# Patient Record
Sex: FEMALE | Race: WHITE | NOT HISPANIC OR LATINO | Employment: OTHER | URBAN - METROPOLITAN AREA
[De-identification: names, ages, dates, MRNs, and addresses within clinical notes are randomized per-mention and may not be internally consistent; named-entity substitution may affect disease eponyms.]

---

## 2018-11-20 ENCOUNTER — HOSPITAL ENCOUNTER (OUTPATIENT)
Dept: URGENT CARE | Facility: CLINIC | Age: 73
Discharge: HOME | End: 2018-11-20
Attending: FAMILY MEDICINE | Admitting: FAMILY MEDICINE

## 2018-12-04 ENCOUNTER — HOSPITAL ENCOUNTER (OUTPATIENT)
Dept: MAMMOGRAPHY | Facility: HOSPITAL | Age: 73
Discharge: HOME | End: 2018-12-04
Attending: NURSE PRACTITIONER | Admitting: NURSE PRACTITIONER

## 2019-06-14 ENCOUNTER — TELEPHONE (OUTPATIENT)
Dept: CARDIAC SURGERY | Facility: CLINIC | Age: 74
End: 2019-06-14

## 2019-06-14 NOTE — TELEPHONE ENCOUNTER
LVM FOR PT TO CALL BACK AND SCHEDULE NEW PT APPT WITH DR ABHI JUAREZ IN [place] OFFICE PER REFERRAL FROM DR QUINTANILLA

## 2019-06-18 ENCOUNTER — TELEPHONE (OUTPATIENT)
Dept: CARDIOLOGY | Facility: CLINIC | Age: 74
End: 2019-06-18

## 2019-06-18 NOTE — TELEPHONE ENCOUNTER
Returned patient's call in regards to whether or not she is supposed to be taking Cartia  mg.  Patient made aware that yes she is to continue taking Cartia.  Patient made aware that Dr. Wild stopped her Tikosyn.  Patient verbalized understanding.

## 2019-07-09 RX ORDER — DILTIAZEM HYDROCHLORIDE 180 MG/1
CAPSULE, EXTENDED RELEASE ORAL
Qty: 30 CAPSULE | Refills: 0 | Status: SHIPPED | OUTPATIENT
Start: 2019-07-09 | End: 2019-08-11 | Stop reason: SDUPTHER

## 2019-07-16 ENCOUNTER — ANTICOAGULATION VISIT (OUTPATIENT)
Dept: CARDIOLOGY | Facility: CLINIC | Age: 74
End: 2019-07-16

## 2019-07-16 VITALS
HEART RATE: 83 BPM | WEIGHT: 234 LBS | SYSTOLIC BLOOD PRESSURE: 131 MMHG | BODY MASS INDEX: 36.65 KG/M2 | DIASTOLIC BLOOD PRESSURE: 87 MMHG

## 2019-07-16 DIAGNOSIS — I48.20 CHRONIC ATRIAL FIBRILLATION (HCC): ICD-10-CM

## 2019-07-16 DIAGNOSIS — Z79.01 LONG TERM (CURRENT) USE OF ANTICOAGULANTS: ICD-10-CM

## 2019-07-16 PROBLEM — I48.91 ATRIAL FIBRILLATION (HCC): Status: ACTIVE | Noted: 2019-07-16

## 2019-07-16 LAB — INR PPP: 2.1 (ref 2–3)

## 2019-07-16 PROCEDURE — 85610 PROTHROMBIN TIME: CPT | Performed by: INTERNAL MEDICINE

## 2019-07-16 PROCEDURE — 36416 COLLJ CAPILLARY BLOOD SPEC: CPT | Performed by: INTERNAL MEDICINE

## 2019-07-19 ENCOUNTER — TELEPHONE (OUTPATIENT)
Dept: CARDIOLOGY | Facility: CLINIC | Age: 74
End: 2019-07-19

## 2019-07-19 NOTE — TELEPHONE ENCOUNTER
Return patient's call.  Pt states she cancelled her appointment with Dr. Wild for today because she has not seen surgeon yet.  Pt states she will call to reschedule her appointment with Dr. Wild.

## 2019-08-05 ENCOUNTER — OFFICE VISIT (OUTPATIENT)
Dept: CARDIAC SURGERY | Facility: CLINIC | Age: 74
End: 2019-08-05

## 2019-08-05 VITALS
TEMPERATURE: 98.3 F | HEART RATE: 93 BPM | DIASTOLIC BLOOD PRESSURE: 62 MMHG | HEIGHT: 66 IN | BODY MASS INDEX: 37.48 KG/M2 | OXYGEN SATURATION: 91 % | WEIGHT: 233.2 LBS | SYSTOLIC BLOOD PRESSURE: 114 MMHG

## 2019-08-05 DIAGNOSIS — I48.0 PAROXYSMAL ATRIAL FIBRILLATION (HCC): Primary | ICD-10-CM

## 2019-08-05 DIAGNOSIS — I10 ESSENTIAL HYPERTENSION: ICD-10-CM

## 2019-08-05 DIAGNOSIS — E66.9 OBESITY WITH BODY MASS INDEX OF 30.0-39.9: ICD-10-CM

## 2019-08-05 PROCEDURE — 99205 OFFICE O/P NEW HI 60 MIN: CPT | Performed by: THORACIC SURGERY (CARDIOTHORACIC VASCULAR SURGERY)

## 2019-08-05 RX ORDER — WARFARIN SODIUM 4 MG/1
4 TABLET ORAL
COMMUNITY
End: 2019-10-03 | Stop reason: SDUPTHER

## 2019-08-05 RX ORDER — LYSINE 500 MG
TABLET ORAL DAILY
COMMUNITY

## 2019-08-05 RX ORDER — ESOMEPRAZOLE MAGNESIUM 40 MG/1
40 CAPSULE, DELAYED RELEASE ORAL
COMMUNITY

## 2019-08-05 RX ORDER — CHLORAL HYDRATE 500 MG
CAPSULE ORAL
COMMUNITY

## 2019-08-05 RX ORDER — POTASSIUM CHLORIDE 750 MG/1
10 TABLET, FILM COATED, EXTENDED RELEASE ORAL 2 TIMES DAILY
COMMUNITY
End: 2020-06-22 | Stop reason: DRUGHIGH

## 2019-08-12 RX ORDER — FUROSEMIDE 20 MG/1
TABLET ORAL
Qty: 60 TABLET | Refills: 0 | Status: SHIPPED | OUTPATIENT
Start: 2019-08-12 | End: 2019-09-08 | Stop reason: SDUPTHER

## 2019-08-12 RX ORDER — DILTIAZEM HYDROCHLORIDE 180 MG/1
CAPSULE, EXTENDED RELEASE ORAL
Qty: 30 CAPSULE | Refills: 0 | Status: SHIPPED | OUTPATIENT
Start: 2019-08-12 | End: 2019-09-08 | Stop reason: SDUPTHER

## 2019-08-17 NOTE — PROGRESS NOTES
BCS CONSULT    Reason for Consultation: PAF, evaluation for possible Convergent Procedure.    History of Present Illness:     This is a pleasant 73 year old woman with PAF and sick sinus syndrome. She received a PPM in 2012. She has been electrocardioverted several times. She has also suffered a DVT and has an IVC filter in place; this IVC filter prevented her from previously receiving an ablation (access could not be acquired across the IVC filter).     She has been hospitalized in the past, according to her chart, for CHF exacerbation; the patient herself, interestingly, disputes this. She does not feel she has symptoms when she has her bouts of atrial fibrillation. She is currently on Tikosyn for suppression therapy. She is on warfarin.  She has been referred for a possible Convergent procedure.    Review of Systems   Constitutional: Negative for activity change, appetite change, chills, diaphoresis, fatigue, fever and unexpected weight change.   HENT: Negative for congestion, hearing loss, nosebleeds, postnasal drip, rhinorrhea, sinus pain, sneezing, tinnitus and voice change.    Eyes: Negative for photophobia, pain, discharge, redness, itching and visual disturbance.   Respiratory: Positive for shortness of breath. Negative for apnea, cough, choking, chest tightness, wheezing and stridor.    Cardiovascular: Positive for leg swelling. Negative for chest pain and palpitations.   Gastrointestinal: Negative for abdominal distention, abdominal pain, blood in stool, constipation, diarrhea, nausea, rectal pain and vomiting.   Endocrine: Negative for cold intolerance, heat intolerance, polydipsia, polyphagia and polyuria.   Genitourinary: Negative for dysuria, flank pain and hematuria.   Musculoskeletal: Positive for arthralgias, back pain and gait problem. Negative for joint swelling, myalgias, neck pain and neck stiffness.   Skin: Negative for color change, pallor, rash and wound.   Allergic/Immunologic: Negative for  environmental allergies, food allergies and immunocompromised state.   Neurological: Negative for dizziness, tremors, seizures, syncope, facial asymmetry, speech difficulty, weakness, light-headedness, numbness and headaches.   Hematological: Negative for adenopathy. Does not bruise/bleed easily.   Psychiatric/Behavioral: Negative for agitation, behavioral problems, confusion, decreased concentration, dysphoric mood, hallucinations, self-injury, sleep disturbance and suicidal ideas. The patient is not nervous/anxious and is not hyperactive.         Past Medical History:   Diagnosis Date   • Atrial fibrillation (CMS/HCC)    • CHF (congestive heart failure) (CMS/HCC)    • DVT (deep venous thrombosis) (CMS/HCC)     and venous filter   • Hypertension    • Stroke (CMS/HCC)      Past Surgical History:   Procedure Laterality Date   • BARIATRIC SURGERY  2005   • CARDIAC CATHETERIZATION  02/01/2012   • CARDIOVERSION      CARDIOVERSION X 3   • CHOLECYSTECTOMY     • GASTRIC BYPASS  2005   • HERNIA REPAIR     • OTHER SURGICAL HISTORY      RF ablation failure because of lack of RA access   • PACEMAKER IMPLANTATION  08/15/2012    permanent dual chamber - medtronic MRI compatible   • TONSILLECTOMY       Family History   Problem Relation Age of Onset   • Stroke Father    • Stroke Other    • Heart disease Other         FH - MI     Social History     Tobacco Use   • Smoking status: Never Smoker   • Smokeless tobacco: Never Used   Substance Use Topics   • Alcohol use: No     Frequency: Never   • Drug use: No       (Not in a hospital admission)    Current Outpatient Medications:   •  esomeprazole (nexIUM) 40 MG capsule, Take 40 mg by mouth Every Morning Before Breakfast., Disp: , Rfl:   •  L-Lysine 500 MG tablet tablet, Take  by mouth Daily., Disp: , Rfl:   •  metoprolol tartrate (LOPRESSOR) 25 MG tablet, Take 25 mg by mouth 2 (Two) Times a Day., Disp: , Rfl:   •  Multiple Vitamin (MULTI-VITAMIN DAILY PO), Take  by mouth., Disp: , Rfl:  "  •  Omega-3 1000 MG capsule, Take  by mouth., Disp: , Rfl:   •  potassium chloride (K-DUR) 10 MEQ CR tablet, Take 10 mEq by mouth 2 (Two) Times a Day., Disp: , Rfl:   •  warfarin (COUMADIN) 4 MG tablet, Take 4 mg by mouth Daily., Disp: , Rfl:   •  CARTIA  MG 24 hr capsule, TAKE ONE CAPSULE BY MOUTH DAILY, Disp: 30 capsule, Rfl: 0  •  furosemide (LASIX) 20 MG tablet, TAKE ONE TABLET BY MOUTH TWICE A DAY, Disp: 60 tablet, Rfl: 0    Allergies:  Lactulose    Objective      Vital Signs       Flowsheet Rows      First Filed Value   Admission Height  167.6 cm (66\") Documented at 08/05/2019 1219   Admission Weight  106 kg (233 lb 3.2 oz) Documented at 08/05/2019 1219        167.6 cm (66\")    Physical Exam   Constitutional: She is oriented to person, place, and time. She appears well-developed and well-nourished. No distress.   HENT:   Head: Normocephalic and atraumatic.   Mouth/Throat: No oropharyngeal exudate.   Eyes: EOM are normal. Pupils are equal, round, and reactive to light. No scleral icterus.   Neck: Normal range of motion. Neck supple. No JVD present. No tracheal deviation present. No thyromegaly present.   Cardiovascular: Normal rate. An irregular rhythm present. PMI is not displaced. Exam reveals no gallop and no friction rub.   No murmur heard.  Pulses:       Radial pulses are 2+ on the right side, and 2+ on the left side.        Femoral pulses are 2+ on the right side, and 2+ on the left side.       Dorsalis pedis pulses are 1+ on the right side, and 1+ on the left side.   Pulmonary/Chest: Effort normal. No stridor. No respiratory distress. She has no wheezes. She has rales. She exhibits no tenderness.   Abdominal: Soft. Bowel sounds are normal. She exhibits no distension. There is no tenderness.   Obese.   Musculoskeletal: Normal range of motion. She exhibits edema. She exhibits no tenderness or deformity.   Lymphadenopathy:     She has no cervical adenopathy.   Neurological: She is alert and oriented " to person, place, and time. She has normal strength. She displays normal reflexes. No cranial nerve deficit or sensory deficit. She exhibits normal muscle tone. She displays a negative Romberg sign. Coordination normal. GCS eye subscore is 4. GCS verbal subscore is 5. GCS motor subscore is 6.   Skin: Skin is warm and dry. Capillary refill takes 2 to 3 seconds. No rash noted. She is not diaphoretic. No erythema. No pallor.   Psychiatric: She has a normal mood and affect. Her speech is normal and behavior is normal. Judgment and thought content normal. Cognition and memory are normal.   Vitals reviewed.      Results Review:       Assessment/Plan:     This is a pleasant 73 year old woman with PAF. The patient insists she is asymptomatic, even when in afib. She is also exceedingly wary of undergoing any surgical procedure, and would like to avoid any invasive intervention if possible. I am also sceptical that a stand alone partial left atrial epicardial ablation will hold her atrial fibrillation, given its chronicity. The patient would like to continue her medical treatment only, for now. She is agreeable to a return visit in 6 months to gauge her progress.     Thank you for this kind consultation.      Lara Pérez MD  08/17/19  4:11 PM

## 2019-08-20 ENCOUNTER — ANTICOAGULATION VISIT (OUTPATIENT)
Dept: CARDIOLOGY | Facility: CLINIC | Age: 74
End: 2019-08-20

## 2019-08-20 VITALS
WEIGHT: 225.5 LBS | DIASTOLIC BLOOD PRESSURE: 65 MMHG | BODY MASS INDEX: 36.4 KG/M2 | HEART RATE: 71 BPM | SYSTOLIC BLOOD PRESSURE: 132 MMHG

## 2019-08-20 DIAGNOSIS — I48.0 PAROXYSMAL ATRIAL FIBRILLATION (HCC): ICD-10-CM

## 2019-08-20 DIAGNOSIS — Z79.01 LONG TERM (CURRENT) USE OF ANTICOAGULANTS: ICD-10-CM

## 2019-08-20 LAB — INR PPP: 2.9 (ref 2–3)

## 2019-08-20 PROCEDURE — 36416 COLLJ CAPILLARY BLOOD SPEC: CPT | Performed by: INTERNAL MEDICINE

## 2019-08-20 PROCEDURE — 85610 PROTHROMBIN TIME: CPT | Performed by: INTERNAL MEDICINE

## 2019-09-09 RX ORDER — FUROSEMIDE 20 MG/1
TABLET ORAL
Qty: 60 TABLET | Refills: 5 | Status: SHIPPED | OUTPATIENT
Start: 2019-09-09 | End: 2019-11-11 | Stop reason: SDUPTHER

## 2019-09-09 RX ORDER — DILTIAZEM HYDROCHLORIDE 180 MG/1
CAPSULE, EXTENDED RELEASE ORAL
Qty: 30 CAPSULE | Refills: 0 | Status: SHIPPED | OUTPATIENT
Start: 2019-09-09 | End: 2019-10-16 | Stop reason: SDUPTHER

## 2019-09-24 ENCOUNTER — ANTICOAGULATION VISIT (OUTPATIENT)
Dept: CARDIOLOGY | Facility: CLINIC | Age: 74
End: 2019-09-24

## 2019-09-24 VITALS
DIASTOLIC BLOOD PRESSURE: 86 MMHG | BODY MASS INDEX: 36.15 KG/M2 | HEART RATE: 76 BPM | WEIGHT: 224 LBS | SYSTOLIC BLOOD PRESSURE: 140 MMHG

## 2019-09-24 DIAGNOSIS — I48.0 PAROXYSMAL ATRIAL FIBRILLATION (HCC): ICD-10-CM

## 2019-09-24 DIAGNOSIS — Z79.01 LONG TERM (CURRENT) USE OF ANTICOAGULANTS: ICD-10-CM

## 2019-09-24 LAB — INR PPP: 3.8 (ref 0.9–1.1)

## 2019-09-24 PROCEDURE — 36416 COLLJ CAPILLARY BLOOD SPEC: CPT | Performed by: INTERNAL MEDICINE

## 2019-09-24 PROCEDURE — 85610 PROTHROMBIN TIME: CPT | Performed by: INTERNAL MEDICINE

## 2019-10-04 RX ORDER — WARFARIN SODIUM 4 MG/1
TABLET ORAL
Qty: 200 TABLET | Refills: 0 | Status: SHIPPED | OUTPATIENT
Start: 2019-10-04 | End: 2020-02-24 | Stop reason: SDUPTHER

## 2019-10-17 RX ORDER — DILTIAZEM HYDROCHLORIDE 180 MG/1
CAPSULE, EXTENDED RELEASE ORAL
Qty: 30 CAPSULE | Refills: 0 | Status: SHIPPED | OUTPATIENT
Start: 2019-10-17 | End: 2019-11-11 | Stop reason: SDUPTHER

## 2019-10-22 ENCOUNTER — ANTICOAGULATION VISIT (OUTPATIENT)
Dept: CARDIOLOGY | Facility: CLINIC | Age: 74
End: 2019-10-22

## 2019-10-22 VITALS
DIASTOLIC BLOOD PRESSURE: 62 MMHG | BODY MASS INDEX: 36.8 KG/M2 | HEART RATE: 80 BPM | SYSTOLIC BLOOD PRESSURE: 110 MMHG | WEIGHT: 228 LBS

## 2019-10-22 DIAGNOSIS — I48.0 PAROXYSMAL ATRIAL FIBRILLATION (HCC): ICD-10-CM

## 2019-10-22 DIAGNOSIS — Z79.01 LONG TERM (CURRENT) USE OF ANTICOAGULANTS: ICD-10-CM

## 2019-10-22 LAB — INR PPP: 2.4 (ref 0.9–1.1)

## 2019-10-22 PROCEDURE — 36416 COLLJ CAPILLARY BLOOD SPEC: CPT | Performed by: INTERNAL MEDICINE

## 2019-10-22 PROCEDURE — 85610 PROTHROMBIN TIME: CPT | Performed by: INTERNAL MEDICINE

## 2019-11-08 ENCOUNTER — HOSPITAL ENCOUNTER (EMERGENCY)
Facility: HOSPITAL | Age: 74
Discharge: HOME | End: 2019-11-09
Attending: EMERGENCY MEDICINE | Admitting: EMERGENCY MEDICINE

## 2019-11-08 DIAGNOSIS — R04.0 EPISTAXIS: Primary | ICD-10-CM

## 2019-11-08 PROCEDURE — 99283 EMERGENCY DEPT VISIT LOW MDM: CPT

## 2019-11-09 VITALS
WEIGHT: 229.4 LBS | BODY MASS INDEX: 36 KG/M2 | RESPIRATION RATE: 16 BRPM | HEART RATE: 137 BPM | TEMPERATURE: 97.8 F | OXYGEN SATURATION: 94 % | HEIGHT: 67 IN | DIASTOLIC BLOOD PRESSURE: 89 MMHG | SYSTOLIC BLOOD PRESSURE: 149 MMHG

## 2019-11-09 LAB
BASOPHILS # BLD AUTO: 0.1 10*3/MM3 (ref 0–0.2)
BASOPHILS NFR BLD AUTO: 1 % (ref 0–1.5)
DEPRECATED RDW RBC AUTO: 44.6 FL (ref 37–54)
EOSINOPHIL # BLD AUTO: 0.1 10*3/MM3 (ref 0–0.4)
EOSINOPHIL NFR BLD AUTO: 1.1 % (ref 0.3–6.2)
ERYTHROCYTE [DISTWIDTH] IN BLOOD BY AUTOMATED COUNT: 13.9 % (ref 12.3–15.4)
HCT VFR BLD AUTO: 44.8 % (ref 34–46.6)
HGB BLD-MCNC: 14.9 G/DL (ref 12–15.9)
INR PPP: 3.01 (ref 2–3)
LYMPHOCYTES # BLD AUTO: 2.3 10*3/MM3 (ref 0.7–3.1)
LYMPHOCYTES NFR BLD AUTO: 27.4 % (ref 19.6–45.3)
MCH RBC QN AUTO: 30.3 PG (ref 26.6–33)
MCHC RBC AUTO-ENTMCNC: 33.2 G/DL (ref 31.5–35.7)
MCV RBC AUTO: 91.3 FL (ref 79–97)
MONOCYTES # BLD AUTO: 0.8 10*3/MM3 (ref 0.1–0.9)
MONOCYTES NFR BLD AUTO: 9.1 % (ref 5–12)
NEUTROPHILS # BLD AUTO: 5.2 10*3/MM3 (ref 1.7–7)
NEUTROPHILS NFR BLD AUTO: 61.4 % (ref 42.7–76)
NRBC BLD AUTO-RTO: 0.1 /100 WBC (ref 0–0.2)
PLATELET # BLD AUTO: 188 10*3/MM3 (ref 140–450)
PMV BLD AUTO: 7.8 FL (ref 6–12)
PROTHROMBIN TIME: 28.4 SECONDS (ref 19.4–28.5)
RBC # BLD AUTO: 4.91 10*6/MM3 (ref 3.77–5.28)
WBC NRBC COR # BLD: 8.5 10*3/MM3 (ref 3.4–10.8)

## 2019-11-09 PROCEDURE — 85025 COMPLETE CBC W/AUTO DIFF WBC: CPT | Performed by: EMERGENCY MEDICINE

## 2019-11-09 PROCEDURE — 85610 PROTHROMBIN TIME: CPT | Performed by: EMERGENCY MEDICINE

## 2019-11-09 NOTE — ED PROVIDER NOTES
Subjective   Pleasant 74-year-old female complains of intermittent nosebleeds for the past several months, worse tonight on the left nostril, no trauma, onset at 7:30 PM, moderate, no clear relieving factors the patient is on warfarin.  Patient denies any blood in stool, urine, unexplained bleeding.            Review of Systems   HENT: Positive for nosebleeds.    All other systems reviewed and are negative.      Past Medical History:   Diagnosis Date   • Atrial fibrillation (CMS/HCC)    • CHF (congestive heart failure) (CMS/HCC)    • DVT (deep venous thrombosis) (CMS/HCC)     and venous filter   • Hypertension    • Stroke (CMS/HCC)        Allergies   Allergen Reactions   • Lactulose Unknown (See Comments)     Unknown         Past Surgical History:   Procedure Laterality Date   • BARIATRIC SURGERY  2005   • CARDIAC CATHETERIZATION  02/01/2012   • CARDIOVERSION      CARDIOVERSION X 3   • CHOLECYSTECTOMY     • GASTRIC BYPASS  2005   • HERNIA REPAIR     • OTHER SURGICAL HISTORY      RF ablation failure because of lack of RA access   • PACEMAKER IMPLANTATION  08/15/2012    permanent dual chamber - medtronic MRI compatible   • TONSILLECTOMY         Family History   Problem Relation Age of Onset   • Stroke Father    • Stroke Other    • Heart disease Other         FH - MI       Social History     Socioeconomic History   • Marital status:      Spouse name: Not on file   • Number of children: Not on file   • Years of education: Not on file   • Highest education level: Not on file   Tobacco Use   • Smoking status: Never Smoker   • Smokeless tobacco: Never Used   Substance and Sexual Activity   • Alcohol use: No     Frequency: Never   • Drug use: No           Objective   Physical Exam   Constitutional: She appears well-developed and well-nourished.   HENT:   Head: Normocephalic and atraumatic.   Mild amount of active bleeding in left nostril, right clear   Eyes: Conjunctivae are normal. Pupils are equal, round, and  reactive to light.   Neck: Normal range of motion. Neck supple.   Cardiovascular:   Irregular irregular rhythm   Pulmonary/Chest: Effort normal and breath sounds normal.   Musculoskeletal: Normal range of motion.   Neurological: She is alert.   Skin: Skin is warm and dry. Capillary refill takes less than 2 seconds.   Psychiatric: She has a normal mood and affect. Her behavior is normal.       Procedures           ED Course                  MDM  Number of Diagnoses or Management Options  Epistaxis:   Diagnosis management comments: Results for orders placed or performed during the hospital encounter of 11/08/19  -Protime-INR       Result                      Value             Ref Range           Protime                     28.4              19.4 - 28.5 *       INR                         3.01 (H)          2.00 - 3.00    -CBC Auto Differential       Result                      Value             Ref Range           WBC                         8.50              3.40 - 10.80*       RBC                         4.91              3.77 - 5.28 *       Hemoglobin                  14.9              12.0 - 15.9 *       Hematocrit                  44.8              34.0 - 46.6 %       MCV                         91.3              79.0 - 97.0 *       MCH                         30.3              26.6 - 33.0 *       MCHC                        33.2              31.5 - 35.7 *       RDW                         13.9              12.3 - 15.4 %       RDW-SD                      44.6              37.0 - 54.0 *       MPV                         7.8               6.0 - 12.0 fL       Platelets                   188               140 - 450 10*       Neutrophil %                61.4              42.7 - 76.0 %       Lymphocyte %                27.4              19.6 - 45.3 %       Monocyte %                  9.1               5.0 - 12.0 %        Eosinophil %                1.1               0.3 - 6.2 %         Basophil %                  1.0                0.0 - 1.5 %         Neutrophils, Absolute       5.20              1.70 - 7.00 *       Lymphocytes, Absolute       2.30              0.70 - 3.10 *       Monocytes, Absolute         0.80              0.10 - 0.90 *       Eosinophils, Absolute       0.10              0.00 - 0.40 *       Basophils, Absolute         0.10              0.00 - 0.20 *       nRBC                        0.1               0.0 - 0.2 /1*    Clots expelled with patient blowing nose.  Glen-Synephrine instilled.  Pressure placed for 45 minutes.  Reexamined, site of bleeding identified at the anterior nasal septum, cauterized with silver nitrate, observed for 30 minutes thereafter no recurrence of bleeding       Amount and/or Complexity of Data Reviewed  Clinical lab tests: reviewed        Final diagnoses:   Epistaxis              Willy Herrera MD  11/09/19 0135

## 2019-11-11 RX ORDER — FUROSEMIDE 20 MG/1
20 TABLET ORAL 2 TIMES DAILY
Qty: 180 TABLET | Refills: 1 | Status: SHIPPED | OUTPATIENT
Start: 2019-11-11 | End: 2020-04-27

## 2019-11-11 RX ORDER — DILTIAZEM HYDROCHLORIDE 180 MG/1
180 CAPSULE, COATED, EXTENDED RELEASE ORAL DAILY
Qty: 90 CAPSULE | Refills: 3 | Status: SHIPPED | OUTPATIENT
Start: 2019-11-11 | End: 2020-06-15 | Stop reason: ALTCHOICE

## 2019-11-11 NOTE — TELEPHONE ENCOUNTER
Patients  called requesting a 90 day supply of the Furosemide 20mg to be sent to [facility].

## 2019-11-26 ENCOUNTER — ANTICOAGULATION VISIT (OUTPATIENT)
Dept: CARDIOLOGY | Facility: CLINIC | Age: 74
End: 2019-11-26

## 2019-11-26 VITALS
SYSTOLIC BLOOD PRESSURE: 121 MMHG | HEART RATE: 79 BPM | WEIGHT: 227 LBS | BODY MASS INDEX: 35.55 KG/M2 | DIASTOLIC BLOOD PRESSURE: 72 MMHG

## 2019-11-26 DIAGNOSIS — I48.0 PAROXYSMAL ATRIAL FIBRILLATION (HCC): ICD-10-CM

## 2019-11-26 DIAGNOSIS — Z79.01 LONG TERM (CURRENT) USE OF ANTICOAGULANTS: ICD-10-CM

## 2019-11-26 LAB — INR PPP: 2.7 (ref 0.9–1.1)

## 2019-11-26 PROCEDURE — 36416 COLLJ CAPILLARY BLOOD SPEC: CPT | Performed by: INTERNAL MEDICINE

## 2019-11-26 PROCEDURE — 85610 PROTHROMBIN TIME: CPT | Performed by: INTERNAL MEDICINE

## 2019-12-04 ENCOUNTER — TRANSCRIBE ORDERS (OUTPATIENT)
Dept: ADMINISTRATIVE | Facility: HOSPITAL | Age: 74
End: 2019-12-04

## 2019-12-04 DIAGNOSIS — Z12.39 SCREENING BREAST EXAMINATION: Primary | ICD-10-CM

## 2019-12-13 ENCOUNTER — HOSPITAL ENCOUNTER (OUTPATIENT)
Dept: MAMMOGRAPHY | Facility: HOSPITAL | Age: 74
Discharge: HOME | End: 2019-12-13
Admitting: FAMILY MEDICINE

## 2019-12-13 DIAGNOSIS — Z12.39 SCREENING BREAST EXAMINATION: ICD-10-CM

## 2019-12-13 PROCEDURE — 77063 BREAST TOMOSYNTHESIS BI: CPT

## 2019-12-13 PROCEDURE — 77067 SCR MAMMO BI INCL CAD: CPT

## 2020-01-07 ENCOUNTER — ANTICOAGULATION VISIT (OUTPATIENT)
Dept: CARDIOLOGY | Facility: CLINIC | Age: 75
End: 2020-01-07

## 2020-01-07 VITALS
DIASTOLIC BLOOD PRESSURE: 78 MMHG | HEART RATE: 84 BPM | WEIGHT: 227 LBS | BODY MASS INDEX: 35.55 KG/M2 | SYSTOLIC BLOOD PRESSURE: 132 MMHG

## 2020-01-07 DIAGNOSIS — Z79.01 LONG TERM (CURRENT) USE OF ANTICOAGULANTS: ICD-10-CM

## 2020-01-07 DIAGNOSIS — I48.0 PAROXYSMAL ATRIAL FIBRILLATION (HCC): ICD-10-CM

## 2020-01-07 LAB — INR PPP: 2.5 (ref 0.9–1.1)

## 2020-01-07 PROCEDURE — 36416 COLLJ CAPILLARY BLOOD SPEC: CPT | Performed by: INTERNAL MEDICINE

## 2020-01-07 PROCEDURE — 85610 PROTHROMBIN TIME: CPT | Performed by: INTERNAL MEDICINE

## 2020-02-03 ENCOUNTER — OFFICE VISIT (OUTPATIENT)
Dept: CARDIAC SURGERY | Facility: CLINIC | Age: 75
End: 2020-02-03

## 2020-02-03 VITALS
DIASTOLIC BLOOD PRESSURE: 75 MMHG | SYSTOLIC BLOOD PRESSURE: 115 MMHG | HEIGHT: 66 IN | HEART RATE: 74 BPM | OXYGEN SATURATION: 96 % | WEIGHT: 231.2 LBS | BODY MASS INDEX: 37.16 KG/M2

## 2020-02-03 DIAGNOSIS — I48.0 PAROXYSMAL ATRIAL FIBRILLATION (HCC): Primary | ICD-10-CM

## 2020-02-03 PROCEDURE — 99213 OFFICE O/P EST LOW 20 MIN: CPT | Performed by: THORACIC SURGERY (CARDIOTHORACIC VASCULAR SURGERY)

## 2020-02-03 RX ORDER — PHENOL 1.4 %
600 AEROSOL, SPRAY (ML) MUCOUS MEMBRANE DAILY
COMMUNITY

## 2020-02-11 ENCOUNTER — ANTICOAGULATION VISIT (OUTPATIENT)
Dept: CARDIOLOGY | Facility: CLINIC | Age: 75
End: 2020-02-11

## 2020-02-11 VITALS — HEART RATE: 94 BPM | BODY MASS INDEX: 37.28 KG/M2 | WEIGHT: 231 LBS

## 2020-02-11 DIAGNOSIS — Z79.01 LONG TERM (CURRENT) USE OF ANTICOAGULANTS: ICD-10-CM

## 2020-02-11 DIAGNOSIS — I48.0 PAROXYSMAL ATRIAL FIBRILLATION (HCC): ICD-10-CM

## 2020-02-11 LAB — INR PPP: 1.4 (ref 0.9–1.1)

## 2020-02-11 PROCEDURE — 36416 COLLJ CAPILLARY BLOOD SPEC: CPT | Performed by: INTERNAL MEDICINE

## 2020-02-11 PROCEDURE — 85610 PROTHROMBIN TIME: CPT | Performed by: INTERNAL MEDICINE

## 2020-02-24 ENCOUNTER — TELEPHONE (OUTPATIENT)
Dept: CARDIOLOGY | Facility: CLINIC | Age: 75
End: 2020-02-24

## 2020-02-24 DIAGNOSIS — I48.0 PAROXYSMAL ATRIAL FIBRILLATION (HCC): ICD-10-CM

## 2020-02-24 DIAGNOSIS — Z79.01 LONG TERM (CURRENT) USE OF ANTICOAGULANTS: Primary | ICD-10-CM

## 2020-02-24 RX ORDER — WARFARIN SODIUM 4 MG/1
TABLET ORAL
Qty: 150 TABLET | Refills: 0 | Status: SHIPPED | OUTPATIENT
Start: 2020-02-24 | End: 2020-05-26

## 2020-02-24 NOTE — TELEPHONE ENCOUNTER
Hauser  Warfarin tabs 4 mg, takes 8 mg 3 days a week and takes 4 mg the other 4 days  Call was from , call him at [phone] if any questions.

## 2020-03-02 ENCOUNTER — TELEPHONE (OUTPATIENT)
Dept: CARDIOLOGY | Facility: CLINIC | Age: 75
End: 2020-03-02

## 2020-03-02 NOTE — TELEPHONE ENCOUNTER
Pt is now using  [facility] mail order   she does not need refill at this time but  wants us aware of this pharmacy change also, dr jimenez is not listed   as a provider for [facility]  Please call  [facility] [phone]

## 2020-03-17 ENCOUNTER — ANTICOAGULATION VISIT (OUTPATIENT)
Dept: CARDIOLOGY | Facility: CLINIC | Age: 75
End: 2020-03-17

## 2020-03-17 VITALS
WEIGHT: 227 LBS | SYSTOLIC BLOOD PRESSURE: 120 MMHG | HEART RATE: 72 BPM | DIASTOLIC BLOOD PRESSURE: 70 MMHG | BODY MASS INDEX: 36.64 KG/M2

## 2020-03-17 DIAGNOSIS — I48.0 PAROXYSMAL ATRIAL FIBRILLATION (HCC): ICD-10-CM

## 2020-03-17 DIAGNOSIS — Z79.01 LONG TERM (CURRENT) USE OF ANTICOAGULANTS: ICD-10-CM

## 2020-03-17 LAB — INR PPP: 2.1 (ref 0.9–1.1)

## 2020-03-17 PROCEDURE — 85610 PROTHROMBIN TIME: CPT | Performed by: INTERNAL MEDICINE

## 2020-03-17 PROCEDURE — 36416 COLLJ CAPILLARY BLOOD SPEC: CPT | Performed by: INTERNAL MEDICINE

## 2020-04-21 ENCOUNTER — ANTICOAGULATION VISIT (OUTPATIENT)
Dept: CARDIOLOGY | Facility: CLINIC | Age: 75
End: 2020-04-21

## 2020-04-21 VITALS
HEART RATE: 92 BPM | WEIGHT: 224 LBS | SYSTOLIC BLOOD PRESSURE: 123 MMHG | BODY MASS INDEX: 36.15 KG/M2 | DIASTOLIC BLOOD PRESSURE: 73 MMHG

## 2020-04-21 DIAGNOSIS — Z79.01 LONG TERM (CURRENT) USE OF ANTICOAGULANTS: ICD-10-CM

## 2020-04-21 DIAGNOSIS — I48.0 PAROXYSMAL ATRIAL FIBRILLATION (HCC): ICD-10-CM

## 2020-04-21 LAB — INR PPP: 2.7 (ref 0.9–1.1)

## 2020-04-21 PROCEDURE — 85610 PROTHROMBIN TIME: CPT | Performed by: INTERNAL MEDICINE

## 2020-04-21 PROCEDURE — 36416 COLLJ CAPILLARY BLOOD SPEC: CPT | Performed by: INTERNAL MEDICINE

## 2020-04-27 RX ORDER — FUROSEMIDE 20 MG/1
TABLET ORAL
Qty: 180 TABLET | Refills: 0 | Status: SHIPPED | OUTPATIENT
Start: 2020-04-27 | End: 2020-08-10

## 2020-05-25 DIAGNOSIS — Z79.01 LONG TERM (CURRENT) USE OF ANTICOAGULANTS: ICD-10-CM

## 2020-05-25 DIAGNOSIS — I48.0 PAROXYSMAL ATRIAL FIBRILLATION (HCC): ICD-10-CM

## 2020-05-26 RX ORDER — WARFARIN SODIUM 4 MG/1
TABLET ORAL
Qty: 135 TABLET | Refills: 0 | Status: SHIPPED | OUTPATIENT
Start: 2020-05-26 | End: 2020-08-31 | Stop reason: SDUPTHER

## 2020-06-15 ENCOUNTER — OFFICE VISIT (OUTPATIENT)
Dept: CARDIOLOGY | Facility: CLINIC | Age: 75
End: 2020-06-15

## 2020-06-15 ENCOUNTER — CLINICAL SUPPORT NO REQUIREMENTS (OUTPATIENT)
Dept: CARDIOLOGY | Facility: CLINIC | Age: 75
End: 2020-06-15

## 2020-06-15 ENCOUNTER — ANTICOAGULATION VISIT (OUTPATIENT)
Dept: CARDIOLOGY | Facility: CLINIC | Age: 75
End: 2020-06-15

## 2020-06-15 VITALS
BODY MASS INDEX: 35.83 KG/M2 | HEART RATE: 79 BPM | WEIGHT: 222 LBS | SYSTOLIC BLOOD PRESSURE: 140 MMHG | DIASTOLIC BLOOD PRESSURE: 90 MMHG

## 2020-06-15 VITALS
SYSTOLIC BLOOD PRESSURE: 140 MMHG | DIASTOLIC BLOOD PRESSURE: 90 MMHG | WEIGHT: 222 LBS | HEART RATE: 79 BPM | BODY MASS INDEX: 35.68 KG/M2 | HEIGHT: 66 IN

## 2020-06-15 DIAGNOSIS — Z95.0 PACEMAKER: ICD-10-CM

## 2020-06-15 DIAGNOSIS — R00.1 BRADYCARDIA: ICD-10-CM

## 2020-06-15 DIAGNOSIS — I48.0 PAROXYSMAL ATRIAL FIBRILLATION (HCC): ICD-10-CM

## 2020-06-15 DIAGNOSIS — I10 ESSENTIAL HYPERTENSION: ICD-10-CM

## 2020-06-15 DIAGNOSIS — E87.6 HYPOKALEMIA: ICD-10-CM

## 2020-06-15 DIAGNOSIS — I48.0 PAROXYSMAL ATRIAL FIBRILLATION (HCC): Primary | ICD-10-CM

## 2020-06-15 DIAGNOSIS — Z79.01 LONG TERM (CURRENT) USE OF ANTICOAGULANTS: ICD-10-CM

## 2020-06-15 PROBLEM — I50.9 CONGESTIVE HEART FAILURE, ACUTE: Status: ACTIVE | Noted: 2019-05-10

## 2020-06-15 PROBLEM — R60.0 PERIPHERAL EDEMA: Status: ACTIVE | Noted: 2019-05-10

## 2020-06-15 PROBLEM — R06.00 DYSPNEA: Status: ACTIVE | Noted: 2019-05-10

## 2020-06-15 PROBLEM — R60.9 PERIPHERAL EDEMA: Status: ACTIVE | Noted: 2019-05-10

## 2020-06-15 PROBLEM — I63.9 CEREBROVASCULAR ACCIDENT (CVA): Status: ACTIVE | Noted: 2020-06-15

## 2020-06-15 LAB — INR PPP: 2.3 (ref 0.9–1.1)

## 2020-06-15 PROCEDURE — 93000 ELECTROCARDIOGRAM COMPLETE: CPT | Performed by: INTERNAL MEDICINE

## 2020-06-15 PROCEDURE — 36416 COLLJ CAPILLARY BLOOD SPEC: CPT | Performed by: INTERNAL MEDICINE

## 2020-06-15 PROCEDURE — 99214 OFFICE O/P EST MOD 30 MIN: CPT | Performed by: INTERNAL MEDICINE

## 2020-06-15 PROCEDURE — 85610 PROTHROMBIN TIME: CPT | Performed by: INTERNAL MEDICINE

## 2020-06-15 PROCEDURE — 93280 PM DEVICE PROGR EVAL DUAL: CPT | Performed by: INTERNAL MEDICINE

## 2020-06-15 NOTE — PROGRESS NOTES
Subjective:     Encounter Date:06/15/2020      Patient ID: Jasmin Mehta is a 74 y.o. female.    Chief Complaint: Atrial Fibrillation  History of Present Illness     74-year-old white female patient with known history of atrial fibrillation comes back for followup.  patient previously underwent permanent pacemaker placement for tachybrady syndrome. previously she had a cardiac catheterization in 2012,  that  showed,    no evidence of any obstructive coronary artery disease.     Patient underwent repeat cardioversion followed by medical treatment with Tikosyn  Patient on and off is having problems with paroxysmal atrial fibrillation      recently patient was admit to the hospital with congestive heart failure  Probably having some recurrent problems with atrial fibrillation she is followed by electrophysiology regularly   cardiac-wise stable now   May 2019 stress Myoview showed no ischemia and no infarction   May 2019 echocardiogram showed EF 55-60% mild septal hypokinesis RV is mild-to-moderately dilated mild mitral insufficiency  Patient was advised previously to be evaluated and treated aggressively for possible sleep apnea  EKG today atrial fibrillation with intermittent ventricular pacing noted compared to the last EKG no significant changes noted   continued anticoagulation  Follow up in 1 year  Recent labs potassium is low so advised her to double the potassium  The following portions of the patient's history were reviewed and updated as appropriate: Allergies current medications past family history past medical history past social history past surgical history problem list and review of systems  Past Medical History:   Diagnosis Date   • Atrial fibrillation (CMS/HCC)    • CHF (congestive heart failure) (CMS/HCC)    • DVT (deep venous thrombosis) (CMS/HCC)     and venous filter   • Hypertension    • Stroke (CMS/HCC)      Past Surgical History:   Procedure Laterality Date   • BARIATRIC SURGERY  2005   •  "CARDIAC CATHETERIZATION  02/01/2012   • CARDIOVERSION      CARDIOVERSION X 3   • CHOLECYSTECTOMY     • GASTRIC BYPASS  2005   • HERNIA REPAIR     • OTHER SURGICAL HISTORY      RF ablation failure because of lack of RA access   • PACEMAKER IMPLANTATION  08/15/2012    permanent dual chamber - medtronic MRI compatible   • TONSILLECTOMY       /90   Pulse 79   Ht 167.6 cm (66\")   Wt 101 kg (222 lb)   LMP  (LMP Unknown)   BMI 35.83 kg/m²   Family History   Problem Relation Age of Onset   • Stroke Father    • Stroke Other    • Heart disease Other         FH - MI   • Breast cancer Mother        Current Outpatient Medications:   •  calcium carbonate (OS-MEKA) 600 MG tablet, Take 600 mg by mouth Daily., Disp: , Rfl:   •  esomeprazole (nexIUM) 40 MG capsule, Take 40 mg by mouth Every Morning Before Breakfast., Disp: , Rfl:   •  furosemide (LASIX) 20 MG tablet, TAKE 1 TABLET TWICE A DAY, Disp: 180 tablet, Rfl: 0  •  L-Lysine 500 MG tablet tablet, Take  by mouth Daily., Disp: , Rfl:   •  metoprolol tartrate (LOPRESSOR) 25 MG tablet, Take 25 mg by mouth 2 (Two) Times a Day., Disp: , Rfl:   •  Multiple Vitamin (MULTI-VITAMIN DAILY PO), Take  by mouth., Disp: , Rfl:   •  Omega-3 1000 MG capsule, Take  by mouth., Disp: , Rfl:   •  potassium chloride (K-DUR) 10 MEQ CR tablet, Take 10 mEq by mouth 2 (Two) Times a Day., Disp: , Rfl:   •  warfarin (COUMADIN) 4 MG tablet, TAKE 2 TABLETS BY MOUTH ON SUNDAY, TUESDAY, AND THURSDAY. TAKE 1 AND 1/2 TABLET BY MOUTH ALL OTHER DAYS, OR AS DIRECTED, Disp: 135 tablet, Rfl: 0  Social History     Socioeconomic History   • Marital status:      Spouse name: Not on file   • Number of children: Not on file   • Years of education: Not on file   • Highest education level: Not on file   Tobacco Use   • Smoking status: Never Smoker   • Smokeless tobacco: Never Used   Substance and Sexual Activity   • Alcohol use: No     Frequency: Never   • Drug use: No     Allergies   Allergen Reactions "   • Lactulose Unknown (See Comments)     Unknown       Review of Systems   Constitution: Negative for fever and malaise/fatigue.   HENT: Negative for congestion and hearing loss.    Eyes: Negative for double vision and visual disturbance.   Cardiovascular: Negative for chest pain, claudication, dyspnea on exertion, leg swelling and syncope.   Respiratory: Negative for cough and shortness of breath.    Endocrine: Negative for cold intolerance.   Skin: Negative for color change and rash.   Musculoskeletal: Negative for arthritis and joint pain.   Gastrointestinal: Negative for abdominal pain and heartburn.   Genitourinary: Negative for hematuria.   Neurological: Negative for excessive daytime sleepiness and dizziness.   Psychiatric/Behavioral: Negative for depression. The patient is not nervous/anxious.    All other systems reviewed and are negative.             Objective:     Physical Exam   Constitutional: She is oriented to person, place, and time. She appears well-developed and well-nourished. She is cooperative.   HENT:   Head: Normocephalic and atraumatic.   Mouth/Throat: Uvula is midline and oropharynx is clear and moist. No oral lesions.   Eyes: Conjunctivae are normal. No scleral icterus.   Neck: Trachea normal. Neck supple. No JVD present. Carotid bruit is not present. No thyromegaly present.   Cardiovascular: Normal rate, S1 normal, S2 normal, normal heart sounds, intact distal pulses and normal pulses. An irregularly irregular rhythm present. PMI is not displaced. Exam reveals no gallop and no friction rub.   No murmur heard.  Pulmonary/Chest: Effort normal and breath sounds normal.   Abdominal: Soft. Bowel sounds are normal.   Musculoskeletal: Normal range of motion.   Neurological: She is alert and oriented to person, place, and time. She has normal strength.   No focal deficits   Skin: Skin is warm. No cyanosis.   Psychiatric: She has a normal mood and affect.         ECG 12 Lead  Date/Time: 6/15/2020  1:09 PM  Performed by: Rylan Burns MD  Authorized by: Rylan Burns MD   Comments: EKG today atrial fibrillation with intermittent ventricular pacing noted compared to the last EKG no significant changes noted            Lab Review:       Assessment:          Diagnosis Plan   1. Paroxysmal atrial fibrillation (CMS/HCC)     2. Pacemaker     3. Bradycardia     4. Essential hypertension     5. Hypokalemia            Plan:         Paroxysmal atrial fibrillation currently somewhat persistently staying in atrial fibrillation followed by EP continue anticoagulation  Hypertension continue aggressive control patient was advised to decrease the salt intake  Recent labs showed potassium is mildly low so advised to double the potassium

## 2020-06-22 RX ORDER — POTASSIUM CHLORIDE 20 MEQ/1
20 TABLET, EXTENDED RELEASE ORAL 2 TIMES DAILY
Qty: 180 TABLET | Refills: 3 | Status: SHIPPED | OUTPATIENT
Start: 2020-06-22 | End: 2020-07-28 | Stop reason: DRUGHIGH

## 2020-06-24 ENCOUNTER — OFFICE VISIT (OUTPATIENT)
Dept: ORTHOPEDIC SURGERY | Facility: CLINIC | Age: 75
End: 2020-06-24

## 2020-06-24 VITALS
SYSTOLIC BLOOD PRESSURE: 114 MMHG | HEART RATE: 74 BPM | DIASTOLIC BLOOD PRESSURE: 68 MMHG | BODY MASS INDEX: 35.68 KG/M2 | HEIGHT: 66 IN | WEIGHT: 222 LBS

## 2020-06-24 DIAGNOSIS — S52.614A CLOSED NONDISPLACED FRACTURE OF STYLOID PROCESS OF RIGHT ULNA, INITIAL ENCOUNTER: ICD-10-CM

## 2020-06-24 DIAGNOSIS — S52.591A OTHER CLOSED FRACTURE OF DISTAL END OF RIGHT RADIUS, INITIAL ENCOUNTER: Primary | ICD-10-CM

## 2020-06-24 PROCEDURE — 99203 OFFICE O/P NEW LOW 30 MIN: CPT | Performed by: FAMILY MEDICINE

## 2020-06-24 PROCEDURE — 25600 CLTX DST RDL FX/EPHYS SEP WO: CPT | Performed by: FAMILY MEDICINE

## 2020-06-24 NOTE — PROGRESS NOTES
Primary Care Sports Medicine Office Visit Note     Patient ID: Jasmin Mehta is a 74 y.o. female.    Chief Complaint:  Chief Complaint   Patient presents with   • Right Wrist - Establish Care     HPI:    Ms. Jasmin Mehta is a 74 y.o. female who presents to the clinic today for R wrist pain. Pt states 2 days ago , Monday evening, she was walking in her yard and tripped over a bag of gravel. Landed on an outstretched R hand in front of her. Immediate amount of swelling and pain s/p fall. She then went to urgent care for further evaluation. She was told there she had fractures of both the distal radius and ulna. She since has been icing, elevating, and wearing a brace. Taking tylenol which does not help much.     Past Medical History:   Diagnosis Date   • Atrial fibrillation (CMS/HCC)    • CHF (congestive heart failure) (CMS/HCC)    • DVT (deep venous thrombosis) (CMS/HCC)     and venous filter   • Hypertension    • Stroke (CMS/HCC)        Past Surgical History:   Procedure Laterality Date   • BARIATRIC SURGERY  2005   • CARDIAC CATHETERIZATION  02/01/2012   • CARDIOVERSION      CARDIOVERSION X 3   • CHOLECYSTECTOMY     • GASTRIC BYPASS  2005   • HERNIA REPAIR     • OTHER SURGICAL HISTORY      RF ablation failure because of lack of RA access   • PACEMAKER IMPLANTATION  08/15/2012    permanent dual chamber - medtronic MRI compatible   • TONSILLECTOMY         Family History   Problem Relation Age of Onset   • Stroke Father    • Stroke Other    • Heart disease Other         FH - MI   • Breast cancer Mother      Social History     Occupational History   • Not on file   Tobacco Use   • Smoking status: Never Smoker   • Smokeless tobacco: Never Used   Substance and Sexual Activity   • Alcohol use: No     Frequency: Never   • Drug use: No   • Sexual activity: Not on file      Review of Systems   Constitutional: Negative for activity change and fever.   Respiratory: Negative for cough and shortness of breath.   "  Cardiovascular: Negative for chest pain.   Gastrointestinal: Negative for constipation, diarrhea, nausea and vomiting.   Musculoskeletal: Positive for arthralgias.   Skin: Negative for color change and rash.   Neurological: Negative for weakness.   Hematological: Does not bruise/bleed easily.       Objective:    /68   Pulse 74   Ht 167.6 cm (66\")   Wt 101 kg (222 lb)   LMP  (LMP Unknown)   BMI 35.83 kg/m²     Physical Examination:  Physical Exam   Constitutional: She appears well-developed and well-nourished. No distress.   HENT:   Head: Normocephalic and atraumatic.   Eyes: Conjunctivae are normal.   Cardiovascular: Intact distal pulses.   Pulmonary/Chest: Effort normal. No respiratory distress.   Neurological: She is alert.   Skin: Skin is warm. Capillary refill takes less than 2 seconds. She is not diaphoretic.   Nursing note and vitals reviewed.    Right Hand Exam     Tenderness   The patient is experiencing tenderness in the ulnar area.    Range of Motion   Wrist   Extension: normal   Flexion: normal   Pronation: normal   Supination: normal     Muscle Strength   Wrist extension: 5/5   Wrist flexion: 5/5   : 5/5     Other   Erythema: absent  Sensation: normal  Pulse: present        Imaging and other tests:  Three-view x-ray of the right wrist dated 6/23/2020 yields nondisplaced vertical medial margin fracture of the distal radius, and ulnar styloid fracture of the distal ulna.  Both of these fractures are nondisplaced, in appropriate alignment.    Assessment and Plan:    1. Other closed fracture of distal end of right radius, initial encounter    2. Closed nondisplaced fracture of styloid process of right ulna, initial encounter    I discussed with the patient all treatment options,.  She would like to attempt nonoperative treatment if possible.  We discussed distal radial ulnar joint complication, and possible continued wrist pain post immobilization with fracture healing.  Patient verbalized " "understanding.  For that reason, we will go and place her in an EXOS fracture brace, and sling.  RTC in 4 weeks for repeat imaging.    Hosea LOPEZ \"Chance\" Benjamin MCARTHUR DO, CAQSM  06/26/20  13:29    Disclaimer: Please note that areas of this note were completed with computer voice recognition software.  Quite often unanticipated grammatical, syntax, homophones, and other interpretive errors are inadvertently transcribed by the computer software. Please excuse any errors that have escaped final proofreading.  "

## 2020-07-14 ENCOUNTER — OFFICE VISIT (OUTPATIENT)
Dept: ORTHOPEDIC SURGERY | Facility: CLINIC | Age: 75
End: 2020-07-14

## 2020-07-14 VITALS
BODY MASS INDEX: 34.72 KG/M2 | WEIGHT: 216 LBS | HEIGHT: 66 IN | HEART RATE: 94 BPM | DIASTOLIC BLOOD PRESSURE: 71 MMHG | SYSTOLIC BLOOD PRESSURE: 114 MMHG

## 2020-07-14 DIAGNOSIS — S52.614A CLOSED NONDISPLACED FRACTURE OF STYLOID PROCESS OF RIGHT ULNA, INITIAL ENCOUNTER: Primary | ICD-10-CM

## 2020-07-14 DIAGNOSIS — S52.591A OTHER CLOSED FRACTURE OF DISTAL END OF RIGHT RADIUS, INITIAL ENCOUNTER: ICD-10-CM

## 2020-07-14 PROCEDURE — 99024 POSTOP FOLLOW-UP VISIT: CPT | Performed by: FAMILY MEDICINE

## 2020-07-14 NOTE — PROGRESS NOTES
"Primary Care Sports Medicine Office Visit Note     Patient ID: Jasmin Mehta is a 74 y.o. female.    Chief Complaint:  Chief Complaint   Patient presents with   • Right Wrist - Follow-up     HPI:    Ms. Jasmin Mehta is a 74 y.o. female who returns to the clinic today for follow-up evaluation of distal radius and ulnar fractures.  The patient seems to be doing incredibly well without any setbacks or problems.  She has worn fracture immobilizer brace as instructed.  Doing very well today, pain-free.    Past Medical History:   Diagnosis Date   • Atrial fibrillation (CMS/HCC)    • CHF (congestive heart failure) (CMS/HCC)    • DVT (deep venous thrombosis) (CMS/HCC)     and venous filter   • Hypertension    • Stroke (CMS/HCC)        Past Surgical History:   Procedure Laterality Date   • BARIATRIC SURGERY  2005   • CARDIAC CATHETERIZATION  02/01/2012   • CARDIOVERSION      CARDIOVERSION X 3   • CHOLECYSTECTOMY     • GASTRIC BYPASS  2005   • HERNIA REPAIR     • OTHER SURGICAL HISTORY      RF ablation failure because of lack of RA access   • PACEMAKER IMPLANTATION  08/15/2012    permanent dual chamber - medtronic MRI compatible   • TONSILLECTOMY         Family History   Problem Relation Age of Onset   • Stroke Father    • Stroke Other    • Heart disease Other         FH - MI   • Breast cancer Mother      Social History     Occupational History   • Not on file   Tobacco Use   • Smoking status: Never Smoker   • Smokeless tobacco: Never Used   Substance and Sexual Activity   • Alcohol use: No     Frequency: Never   • Drug use: No   • Sexual activity: Not on file      Review of Systems   Constitutional: Negative for activity change and fever.   Musculoskeletal: Positive for arthralgias.   Skin: Negative for color change and rash.   Neurological: Negative for weakness.       Objective:    /71   Pulse 94   Ht 167.6 cm (66\")   Wt 98 kg (216 lb)   LMP  (LMP Unknown)   BMI 34.86 kg/m²     Physical Examination:  Physical " "Exam   Constitutional: She appears well-developed and well-nourished. No distress.   HENT:   Head: Normocephalic and atraumatic.   Eyes: Conjunctivae are normal.   Cardiovascular: Intact distal pulses.   Pulmonary/Chest: Effort normal. No respiratory distress.   Neurological: She is alert.   Skin: Skin is warm. Capillary refill takes less than 2 seconds. She is not diaphoretic.   Nursing note and vitals reviewed.    Right Hand Exam     Tenderness   The patient is experiencing no tenderness.     Range of Motion   Wrist   Extension: normal   Flexion: normal   Pronation: normal   Supination: normal     Muscle Strength   Wrist extension: 5/5   Wrist flexion: 5/5   : 5/5     Other   Erythema: absent  Sensation: normal  Pulse: present          Imaging and other tests:  Repeat three-view imaging of the right wrist shows mild healing of distal radial metaphyseal fracture.  There is small avulsion with no sign of healing of the ulnar styloid.  Otherwise distal radius is in proper alignment without any sign of change from previous.  Nondisplaced.    Assessment and Plan:    1. Other closed fracture of distal end of right radius, initial encounter  - XR Wrist 3+ View Right  - Ambulatory Referral to Physical Therapy Evaluate and treat (RIGHT wrist s/p distal rad FX); (gentle ROM)    2. Closed nondisplaced fracture of styloid process of right ulna, initial encounter  - XR Wrist 3+ View Right  - Ambulatory Referral to Physical Therapy Evaluate and treat (RIGHT wrist s/p distal rad FX); (gentle ROM)    This patient seems to be doing incredibly well with fracture mobilization.  I recommended she continue Exos fracture brace for the next 2 weeks, at which time she can then discontinue immobilization.  I would like for her to start physical therapy for stretching and strengthening, range of motion of the wrist joint at this time.  RTC in 4 weeks.    Hosea LOPEZ \"Chance\" Benjamin MCARTHUR DO, CAQSM  07/14/20  13:21    Disclaimer: Please " note that areas of this note were completed with computer voice recognition software.  Quite often unanticipated grammatical, syntax, homophones, and other interpretive errors are inadvertently transcribed by the computer software. Please excuse any errors that have escaped final proofreading.

## 2020-07-28 ENCOUNTER — ANTICOAGULATION VISIT (OUTPATIENT)
Dept: CARDIOLOGY | Facility: CLINIC | Age: 75
End: 2020-07-28

## 2020-07-28 ENCOUNTER — CLINICAL SUPPORT NO REQUIREMENTS (OUTPATIENT)
Dept: CARDIOLOGY | Facility: CLINIC | Age: 75
End: 2020-07-28

## 2020-07-28 ENCOUNTER — OFFICE VISIT (OUTPATIENT)
Dept: CARDIOLOGY | Facility: CLINIC | Age: 75
End: 2020-07-28

## 2020-07-28 VITALS
SYSTOLIC BLOOD PRESSURE: 113 MMHG | HEART RATE: 85 BPM | DIASTOLIC BLOOD PRESSURE: 78 MMHG | WEIGHT: 217 LBS | HEIGHT: 66 IN | BODY MASS INDEX: 34.87 KG/M2 | OXYGEN SATURATION: 97 %

## 2020-07-28 VITALS
BODY MASS INDEX: 35.02 KG/M2 | SYSTOLIC BLOOD PRESSURE: 129 MMHG | HEART RATE: 92 BPM | WEIGHT: 217 LBS | DIASTOLIC BLOOD PRESSURE: 82 MMHG

## 2020-07-28 DIAGNOSIS — I10 ESSENTIAL HYPERTENSION: ICD-10-CM

## 2020-07-28 DIAGNOSIS — I48.0 PAROXYSMAL ATRIAL FIBRILLATION (HCC): Primary | ICD-10-CM

## 2020-07-28 DIAGNOSIS — R00.1 BRADYCARDIA: ICD-10-CM

## 2020-07-28 DIAGNOSIS — I48.20 CHRONIC ATRIAL FIBRILLATION (HCC): ICD-10-CM

## 2020-07-28 DIAGNOSIS — Z95.0 PRESENCE OF CARDIAC PACEMAKER: Primary | ICD-10-CM

## 2020-07-28 DIAGNOSIS — I48.0 PAROXYSMAL ATRIAL FIBRILLATION (HCC): ICD-10-CM

## 2020-07-28 DIAGNOSIS — Z79.01 LONG TERM (CURRENT) USE OF ANTICOAGULANTS: ICD-10-CM

## 2020-07-28 DIAGNOSIS — Z95.0 PACEMAKER: ICD-10-CM

## 2020-07-28 LAB — INR PPP: 2.8 (ref 0.9–1.1)

## 2020-07-28 PROCEDURE — 36416 COLLJ CAPILLARY BLOOD SPEC: CPT | Performed by: INTERNAL MEDICINE

## 2020-07-28 PROCEDURE — 99215 OFFICE O/P EST HI 40 MIN: CPT | Performed by: INTERNAL MEDICINE

## 2020-07-28 PROCEDURE — 85610 PROTHROMBIN TIME: CPT | Performed by: INTERNAL MEDICINE

## 2020-07-28 PROCEDURE — 93279 PRGRMG DEV EVAL PM/LDLS PM: CPT | Performed by: INTERNAL MEDICINE

## 2020-07-28 RX ORDER — POTASSIUM CHLORIDE 750 MG/1
10 TABLET, FILM COATED, EXTENDED RELEASE ORAL 2 TIMES DAILY
COMMUNITY
End: 2020-12-30 | Stop reason: SDUPTHER

## 2020-07-28 NOTE — PROGRESS NOTES
Encounter Date:07/28/2020      Patient ID: Jasmin Mehta is a 74 y.o. female.    Chief Complaint:  Status post pacemaker.  Pulse generator has reached MICHAEL.  Chronic and stable atrial fibrillation  Anticoagulation management      History of Present Illness  The patient had interrogation of the pacemaker today revealed MICHAEL.  Primary cardiologist Dr. Roe  Requested pulse generator replacement.  Patient had dual-chamber pacemaker implantation in the past.  Subsequently has developed atrial fibrillation.  Patient was considered for ablation in the past but was not performed due to presence of IVC filter.  Patient was considered for possible surgical epicardial ablation but surgeon has realized that patient does not have any symptoms from atrial fibrillation.    The patient has been without any chest discomfort ,shortness of breath, palpitations, dizziness or syncope.  Denies having any headache ,abdominal pain ,nausea, vomiting , diarrhea constipation, loss of weight or loss of appetite.  Denies having any excessive bruising ,hematuria or blood in the stool.    Review of all systems negative except as indicated      Assessment and Plan     ]]]]]]]]]]]]]]]]  Impression  ==========  -Status post permanent dual-chamber pacemaker implantation (Medtronic).  8/17/2012 pulse generator has reached MICHAEL.    - Chronic atrial fibrillation on Coumadin.  History of attempted cardioversion  History of attempted ablation.    -History of nonobstructive coronary artery disease 2012    -Hypertension    -Status post IVC filter placement.  History of DVT.  Status post gastric bypass cholecystectomy hernia repair tonsillectomy    -- Non-smoker    - Allergic to lactulose  ===========  Plan  =========  Pulse and it has reached MICHAEL.  Atrial and ventricular leads or MRI compatible.  Patient to have pulse generator replacement soon possible (dual-chamber to maintain MRI compatibility).  Medtronic  Anticoagulation status was reviewed.  Her INR  today is 2.8  Hold Coumadin starting today.  Risks and benefits pros and cons of the procedure were discussed with patient.  Further plan will depend on patient's progress.  ]]]]]]]]]]]]           Diagnosis Plan   1. Presence of cardiac pacemaker  COVID PRE-OP / PRE-PROCEDURE SCREENING ORDER (NO ISOLATION) - Swab, Nasopharynx    Case Request Cath Lab: PPM battery change    Basic Metabolic Panel    ECG 12 Lead    MRSA Screen Culture (Outpatient) - Swab, Nares    COVID PRE-OP / PRE-PROCEDURE SCREENING ORDER (NO ISOLATION) - Swab, Nasopharynx   2. Paroxysmal atrial fibrillation (CMS/HCC)     3. Pacemaker     4. Bradycardia     5. Essential hypertension     6. Long term (current) use of anticoagulants     7. Chronic atrial fibrillation (CMS/HCC)     LAB RESULTS (LAST 7 DAYS)    CBC        BMP        CMP         BNP        TROPONIN        CoAg  Results from last 7 days   Lab Units 07/28/20  0956   INR  2.80*       Creatinine Clearance  CrCl cannot be calculated (Patient's most recent lab result is older than the maximum 30 days allowed.).    ABG        Radiology  No radiology results for the last day                The following portions of the patient's history were reviewed and updated as appropriate: allergies, current medications, past family history, past medical history, past social history, past surgical history and problem list.    Review of Systems   Constitution: Negative for malaise/fatigue.   Cardiovascular: Negative for chest pain, leg swelling, palpitations and syncope.   Respiratory: Negative for shortness of breath.    Skin: Negative for rash.   Gastrointestinal: Negative for nausea and vomiting.   Neurological: Negative for dizziness, light-headedness and numbness.         Current Outpatient Medications:   •  calcium carbonate (OS-MEKA) 600 MG tablet, Take 600 mg by mouth Daily., Disp: , Rfl:   •  esomeprazole (nexIUM) 40 MG capsule, Take 40 mg by mouth Every Morning Before Breakfast., Disp: , Rfl:   •   furosemide (LASIX) 20 MG tablet, TAKE 1 TABLET TWICE A DAY, Disp: 180 tablet, Rfl: 0  •  L-Lysine 500 MG tablet tablet, Take  by mouth Daily., Disp: , Rfl:   •  metoprolol tartrate (LOPRESSOR) 25 MG tablet, Take 25 mg by mouth 2 (Two) Times a Day., Disp: , Rfl:   •  Multiple Vitamin (MULTI-VITAMIN DAILY PO), Take  by mouth., Disp: , Rfl:   •  Omega-3 1000 MG capsule, Take  by mouth., Disp: , Rfl:   •  potassium chloride (K-DUR) 10 MEQ CR tablet, Take 10 mEq by mouth 2 (Two) Times a Day., Disp: , Rfl:   •  warfarin (COUMADIN) 4 MG tablet, TAKE 2 TABLETS BY MOUTH ON SUNDAY, TUESDAY, AND THURSDAY. TAKE 1 AND 1/2 TABLET BY MOUTH ALL OTHER DAYS, OR AS DIRECTED, Disp: 135 tablet, Rfl: 0    Allergies   Allergen Reactions   • Lactulose Unknown (See Comments)     Unknown         Family History   Problem Relation Age of Onset   • Stroke Father    • Stroke Other    • Heart disease Other         FH - MI   • Breast cancer Mother        Past Surgical History:   Procedure Laterality Date   • BARIATRIC SURGERY  2005   • CARDIAC CATHETERIZATION  02/01/2012   • CARDIOVERSION      CARDIOVERSION X 3   • CHOLECYSTECTOMY     • GASTRIC BYPASS  2005   • HERNIA REPAIR     • OTHER SURGICAL HISTORY      RF ablation failure because of lack of RA access   • PACEMAKER IMPLANTATION  08/15/2012    permanent dual chamber - medtronic MRI compatible   • TONSILLECTOMY         Past Medical History:   Diagnosis Date   • Atrial fibrillation (CMS/HCC)    • CHF (congestive heart failure) (CMS/HCC)    • DVT (deep venous thrombosis) (CMS/HCC)     and venous filter   • Hypertension    • Stroke (CMS/HCC)        Family History   Problem Relation Age of Onset   • Stroke Father    • Stroke Other    • Heart disease Other         FH - MI   • Breast cancer Mother        Social History     Socioeconomic History   • Marital status:      Spouse name: Not on file   • Number of children: Not on file   • Years of education: Not on file   • Highest education level:  "Not on file   Tobacco Use   • Smoking status: Never Smoker   • Smokeless tobacco: Never Used   Substance and Sexual Activity   • Alcohol use: No     Frequency: Never   • Drug use: No         Procedures      Objective:       Physical Exam    /78 (BP Location: Left arm, Patient Position: Sitting, Cuff Size: Large Adult)   Pulse 85   Ht 167.6 cm (66\")   Wt 98.4 kg (217 lb)   LMP  (LMP Unknown)   SpO2 97%   BMI 35.02 kg/m²   The patient is alert, oriented and in no distress.    Vital signs as noted above.    Head and neck revealed no carotid bruits or jugular venous distension.  No thyromegaly or lymphadenopathy is present.    Lungs clear.  No wheezing.  Breath sounds are normal bilaterally.    Heart normal first and second heart sounds.  No murmur..  No pericardial rub is present.  No gallop is present.    Abdomen soft and nontender.  No organomegaly is present.    Extremities revealed good peripheral pulses without any pedal edema.    Skin warm and dry.  Pacemaker site looks normal (right-sided).  Patient is left-handed.    Musculoskeletal system is grossly normal.    CNS grossly normal.        "

## 2020-07-29 ENCOUNTER — APPOINTMENT (OUTPATIENT)
Dept: CARDIOLOGY | Facility: HOSPITAL | Age: 75
End: 2020-07-29

## 2020-07-29 ENCOUNTER — LAB (OUTPATIENT)
Dept: LAB | Facility: HOSPITAL | Age: 75
End: 2020-07-29

## 2020-07-29 DIAGNOSIS — Z95.0 PRESENCE OF CARDIAC PACEMAKER: ICD-10-CM

## 2020-07-29 LAB
ANION GAP SERPL CALCULATED.3IONS-SCNC: 12.6 MMOL/L (ref 5–15)
BUN SERPL-MCNC: 31 MG/DL (ref 8–23)
BUN/CREAT SERPL: 25.2 (ref 7–25)
CALCIUM SPEC-SCNC: 9.9 MG/DL (ref 8.6–10.5)
CHLORIDE SERPL-SCNC: 101 MMOL/L (ref 98–107)
CO2 SERPL-SCNC: 25.4 MMOL/L (ref 22–29)
CREAT SERPL-MCNC: 1.23 MG/DL (ref 0.57–1)
GFR SERPL CREATININE-BSD FRML MDRD: 43 ML/MIN/1.73
GLUCOSE SERPL-MCNC: 66 MG/DL (ref 65–99)
MRSA DNA SPEC QL NAA+PROBE: NORMAL
POTASSIUM SERPL-SCNC: 4.8 MMOL/L (ref 3.5–5.2)
SODIUM SERPL-SCNC: 139 MMOL/L (ref 136–145)

## 2020-07-29 PROCEDURE — 87641 MR-STAPH DNA AMP PROBE: CPT

## 2020-07-29 PROCEDURE — 36415 COLL VENOUS BLD VENIPUNCTURE: CPT

## 2020-07-29 PROCEDURE — 80048 BASIC METABOLIC PNL TOTAL CA: CPT

## 2020-07-29 PROCEDURE — U0002 COVID-19 LAB TEST NON-CDC: HCPCS

## 2020-07-29 PROCEDURE — C9803 HOPD COVID-19 SPEC COLLECT: HCPCS

## 2020-07-29 PROCEDURE — U0004 COV-19 TEST NON-CDC HGH THRU: HCPCS

## 2020-07-30 LAB
REF LAB TEST METHOD: NORMAL
SARS-COV-2 RNA RESP QL NAA+PROBE: NOT DETECTED

## 2020-07-31 ENCOUNTER — HOSPITAL ENCOUNTER (OUTPATIENT)
Facility: HOSPITAL | Age: 75
Setting detail: HOSPITAL OUTPATIENT SURGERY
Discharge: HOME | End: 2020-07-31
Attending: INTERNAL MEDICINE | Admitting: INTERNAL MEDICINE

## 2020-07-31 ENCOUNTER — APPOINTMENT (OUTPATIENT)
Dept: GENERAL RADIOLOGY | Facility: HOSPITAL | Age: 75
End: 2020-07-31

## 2020-07-31 VITALS
BODY MASS INDEX: 35.67 KG/M2 | WEIGHT: 214.07 LBS | HEART RATE: 92 BPM | SYSTOLIC BLOOD PRESSURE: 101 MMHG | RESPIRATION RATE: 18 BRPM | DIASTOLIC BLOOD PRESSURE: 70 MMHG | HEIGHT: 65 IN | OXYGEN SATURATION: 88 % | TEMPERATURE: 97.1 F

## 2020-07-31 DIAGNOSIS — Z95.0 PRESENCE OF CARDIAC PACEMAKER: ICD-10-CM

## 2020-07-31 LAB
APTT PPP: 25.9 SECONDS (ref 61–76.5)
INR PPP: 1.43 (ref 2–3)
MRSA DNA SPEC QL NAA+PROBE: NORMAL
PROTHROMBIN TIME: 14.2 SECONDS (ref 19.4–28.5)

## 2020-07-31 PROCEDURE — 99152 MOD SED SAME PHYS/QHP 5/>YRS: CPT | Performed by: INTERNAL MEDICINE

## 2020-07-31 PROCEDURE — C1785 PMKR, DUAL, RATE-RESP: HCPCS | Performed by: INTERNAL MEDICINE

## 2020-07-31 PROCEDURE — 85730 THROMBOPLASTIN TIME PARTIAL: CPT | Performed by: INTERNAL MEDICINE

## 2020-07-31 PROCEDURE — 71045 X-RAY EXAM CHEST 1 VIEW: CPT

## 2020-07-31 PROCEDURE — 25010000002 MIDAZOLAM PER 1 MG: Performed by: INTERNAL MEDICINE

## 2020-07-31 PROCEDURE — 87641 MR-STAPH DNA AMP PROBE: CPT | Performed by: INTERNAL MEDICINE

## 2020-07-31 PROCEDURE — 33228 REMV&REPLC PM GEN DUAL LEAD: CPT | Performed by: INTERNAL MEDICINE

## 2020-07-31 PROCEDURE — 85610 PROTHROMBIN TIME: CPT | Performed by: INTERNAL MEDICINE

## 2020-07-31 PROCEDURE — 99153 MOD SED SAME PHYS/QHP EA: CPT | Performed by: INTERNAL MEDICINE

## 2020-07-31 PROCEDURE — 25010000002 VANCOMYCIN 1 G RECONSTITUTED SOLUTION 1 EACH VIAL: Performed by: INTERNAL MEDICINE

## 2020-07-31 PROCEDURE — 25010000002 FENTANYL CITRATE (PF) 100 MCG/2ML SOLUTION: Performed by: INTERNAL MEDICINE

## 2020-07-31 DEVICE — ENV PM AIGISRX ANTIBAC RESORB 2.5X2.7IN MD: Type: IMPLANTABLE DEVICE | Status: FUNCTIONAL

## 2020-07-31 DEVICE — GEN PM AZURE XT SURESCAN DR MRI: Type: IMPLANTABLE DEVICE | Status: FUNCTIONAL

## 2020-07-31 RX ORDER — SODIUM CHLORIDE 9 MG/ML
30 INJECTION, SOLUTION INTRAVENOUS CONTINUOUS
Status: DISCONTINUED | OUTPATIENT
Start: 2020-07-31 | End: 2020-07-31 | Stop reason: HOSPADM

## 2020-07-31 RX ORDER — SODIUM CHLORIDE 0.9 % (FLUSH) 0.9 %
3 SYRINGE (ML) INJECTION EVERY 12 HOURS SCHEDULED
Status: DISCONTINUED | OUTPATIENT
Start: 2020-07-31 | End: 2020-07-31 | Stop reason: HOSPADM

## 2020-07-31 RX ORDER — DOXYCYCLINE 100 MG/1
100 TABLET ORAL EVERY 12 HOURS SCHEDULED
Status: DISCONTINUED | OUTPATIENT
Start: 2020-07-31 | End: 2020-07-31 | Stop reason: HOSPADM

## 2020-07-31 RX ORDER — SODIUM CHLORIDE 0.9 % (FLUSH) 0.9 %
10 SYRINGE (ML) INJECTION AS NEEDED
Status: DISCONTINUED | OUTPATIENT
Start: 2020-07-31 | End: 2020-07-31 | Stop reason: HOSPADM

## 2020-07-31 RX ORDER — HYDROCODONE BITARTRATE AND ACETAMINOPHEN 5; 325 MG/1; MG/1
1 TABLET ORAL EVERY 4 HOURS PRN
Status: DISCONTINUED | OUTPATIENT
Start: 2020-07-31 | End: 2020-07-31 | Stop reason: HOSPADM

## 2020-07-31 RX ORDER — LIDOCAINE HYDROCHLORIDE AND EPINEPHRINE 10; 10 MG/ML; UG/ML
INJECTION, SOLUTION INFILTRATION; PERINEURAL AS NEEDED
Status: DISCONTINUED | OUTPATIENT
Start: 2020-07-31 | End: 2020-07-31 | Stop reason: HOSPADM

## 2020-07-31 RX ORDER — FENTANYL CITRATE 50 UG/ML
INJECTION, SOLUTION INTRAMUSCULAR; INTRAVENOUS AS NEEDED
Status: DISCONTINUED | OUTPATIENT
Start: 2020-07-31 | End: 2020-07-31 | Stop reason: HOSPADM

## 2020-07-31 RX ORDER — MIDAZOLAM HYDROCHLORIDE 1 MG/ML
INJECTION INTRAMUSCULAR; INTRAVENOUS AS NEEDED
Status: DISCONTINUED | OUTPATIENT
Start: 2020-07-31 | End: 2020-07-31 | Stop reason: HOSPADM

## 2020-07-31 RX ADMIN — SODIUM CHLORIDE 30 ML/HR: 900 INJECTION, SOLUTION INTRAVENOUS at 10:32

## 2020-07-31 RX ADMIN — SODIUM CHLORIDE 1 G: 900 INJECTION, SOLUTION INTRAVENOUS at 11:35

## 2020-08-10 RX ORDER — FUROSEMIDE 20 MG/1
TABLET ORAL
Qty: 180 TABLET | Refills: 2 | Status: SHIPPED | OUTPATIENT
Start: 2020-08-10 | End: 2021-05-07 | Stop reason: SDUPTHER

## 2020-08-11 ENCOUNTER — OFFICE VISIT (OUTPATIENT)
Dept: ORTHOPEDIC SURGERY | Facility: CLINIC | Age: 75
End: 2020-08-11

## 2020-08-11 VITALS
HEIGHT: 55 IN | HEART RATE: 91 BPM | DIASTOLIC BLOOD PRESSURE: 71 MMHG | BODY MASS INDEX: 49.53 KG/M2 | SYSTOLIC BLOOD PRESSURE: 118 MMHG | WEIGHT: 214 LBS

## 2020-08-11 DIAGNOSIS — S52.591D OTHER CLOSED FRACTURE OF DISTAL END OF RIGHT RADIUS WITH ROUTINE HEALING, SUBSEQUENT ENCOUNTER: ICD-10-CM

## 2020-08-11 DIAGNOSIS — S52.614A CLOSED NONDISPLACED FRACTURE OF STYLOID PROCESS OF RIGHT ULNA, INITIAL ENCOUNTER: Primary | ICD-10-CM

## 2020-08-11 PROCEDURE — 99024 POSTOP FOLLOW-UP VISIT: CPT | Performed by: FAMILY MEDICINE

## 2020-08-11 NOTE — PROGRESS NOTES
Primary Care Sports Medicine Office Visit Note     Patient ID: Jasmin Mehta is a 74 y.o. female.    Chief Complaint:  Chief Complaint   Patient presents with   • Right Wrist - Follow-up     HPI:    Ms. Jasmin Mehta is a 74 y.o. female who presents to the clinic today for follow-up of nondisplaced closed fracture of the distal end of the right radius, and ulnar styloid.  Patient states since last visit, she was continued to do physical therapy for range of motion and strength, and is doing well without complaint today.  She is currently 6 weeks out of initial fracture.    Past Medical History:   Diagnosis Date   • Atrial fibrillation (CMS/HCC)    • CHF (congestive heart failure) (CMS/HCC)    • DVT (deep venous thrombosis) (CMS/HCC)     and venous filter   • Hypertension    • Stroke (CMS/HCC)        Past Surgical History:   Procedure Laterality Date   • BARIATRIC SURGERY  2005   • CARDIAC CATHETERIZATION  02/01/2012   • CARDIAC ELECTROPHYSIOLOGY PROCEDURE N/A 7/31/2020    Procedure: PPM generator change - dual;  Surgeon: Valente Duong MD;  Location: [facility] CATH INVASIVE LOCATION;  Service: Cardiovascular;  Laterality: N/A;   • CARDIAC ELECTROPHYSIOLOGY PROCEDURE N/A 7/31/2020    Procedure: Pocket Revision;  Surgeon: Valente Duong MD;  Location: [facility] CATH INVASIVE LOCATION;  Service: Cardiovascular;  Laterality: N/A;   • CARDIOVERSION      CARDIOVERSION X 3   • CHOLECYSTECTOMY     • GASTRIC BYPASS  2005   • HERNIA REPAIR     • OTHER SURGICAL HISTORY      RF ablation failure because of lack of RA access   • PACEMAKER IMPLANTATION  08/15/2012    permanent dual chamber - medtronic MRI compatible   • TONSILLECTOMY         Family History   Problem Relation Age of Onset   • Stroke Father    • Stroke Other    • Heart disease Other         FH - MI   • Breast cancer Mother      Social History     Occupational History   • Not on file   Tobacco Use   • Smoking status: Never Smoker   • Smokeless tobacco: Never Used  "  Substance and Sexual Activity   • Alcohol use: No     Frequency: Never   • Drug use: No   • Sexual activity: Not on file      Review of Systems   Constitutional: Negative for activity change and fever.   Musculoskeletal: Negative for arthralgias.   Skin: Negative for color change and rash.   Neurological: Negative for weakness.       Objective:    /71   Pulse 91   Ht 64.5 cm (25.39\")   Wt 97.1 kg (214 lb)   LMP  (LMP Unknown)   .32 kg/m²     Physical Examination:  Physical Exam   Constitutional: She appears well-developed and well-nourished. No distress.   HENT:   Head: Normocephalic and atraumatic.   Eyes: Conjunctivae are normal.   Cardiovascular: Intact distal pulses.   Pulmonary/Chest: Effort normal. No respiratory distress.   Neurological: She is alert.   Skin: Skin is warm. Capillary refill takes less than 2 seconds. She is not diaphoretic.   Nursing note and vitals reviewed.    Right Hand Exam     Tenderness   The patient is experiencing no tenderness.     Range of Motion   Wrist   Extension: normal   Flexion: normal   Pronation: normal   Supination: normal     Muscle Strength   Wrist extension: 5/5   Wrist flexion: 5/5   : 5/5     Other   Erythema: absent  Sensation: normal  Pulse: present          Imaging and other tests:  Three-view XR of the right wrist shows interval healing of known right radial metaphysis and ulnar styloid fractures.    Assessment and Plan:    1. Closed nondisplaced fracture of styloid process of right ulna, initial encounter  - XR Wrist 3+ View Right    2. Other closed fracture of distal end of right radius with routine healing, subsequent encounter    Patient is doing really well today.  Finish PT, continue home exercises occasionally.  RTC PRN.    Hosea LPOEZ \"Chance\" Benjamin MCARTHUR DO, CAQSM  08/11/20  09:32    Disclaimer: Please note that areas of this note were completed with computer voice recognition software.  Quite often unanticipated grammatical, syntax, " homophones, and other interpretive errors are inadvertently transcribed by the computer software. Please excuse any errors that have escaped final proofreading.

## 2020-08-12 ENCOUNTER — CLINICAL SUPPORT NO REQUIREMENTS (OUTPATIENT)
Dept: CARDIOLOGY | Facility: CLINIC | Age: 75
End: 2020-08-12

## 2020-08-12 ENCOUNTER — OFFICE VISIT (OUTPATIENT)
Dept: CARDIOLOGY | Facility: CLINIC | Age: 75
End: 2020-08-12

## 2020-08-12 VITALS
OXYGEN SATURATION: 96 % | HEART RATE: 68 BPM | DIASTOLIC BLOOD PRESSURE: 76 MMHG | BODY MASS INDEX: 34.21 KG/M2 | SYSTOLIC BLOOD PRESSURE: 121 MMHG | HEIGHT: 67 IN | WEIGHT: 218 LBS

## 2020-08-12 DIAGNOSIS — R00.1 BRADYCARDIA: ICD-10-CM

## 2020-08-12 DIAGNOSIS — Z95.0 PACEMAKER: ICD-10-CM

## 2020-08-12 DIAGNOSIS — I48.0 PAROXYSMAL ATRIAL FIBRILLATION (HCC): Primary | ICD-10-CM

## 2020-08-12 DIAGNOSIS — I10 ESSENTIAL HYPERTENSION: ICD-10-CM

## 2020-08-12 PROCEDURE — 99213 OFFICE O/P EST LOW 20 MIN: CPT | Performed by: INTERNAL MEDICINE

## 2020-08-12 PROCEDURE — 93279 PRGRMG DEV EVAL PM/LDLS PM: CPT | Performed by: INTERNAL MEDICINE

## 2020-08-12 NOTE — PROGRESS NOTES
Subjective:     Encounter Date:08/12/2020      Patient ID: Jasmin Mehta is a 74 y.o. female.    Chief Complaint: hosp f/u PM implant  History of Present Illness     74-year-old white female patient with known history of atrial fibrillation comes back for followup.  patient previously underwent permanent pacemaker placement for tachybrady syndrome. previously she had a cardiac catheterization in 2012,  that  showed,    no evidence of any obstructive coronary artery disease.       Patient on and off is having problems with paroxysmal atrial fibrillation followed by electrophysiology currently on anticoagulation and beta-blockers        May 2019 stress Myoview showed no ischemia and no infarction   May 2019 echocardiogram showed EF 55-60% mild septal hypokinesis RV is mild-to-moderately dilated mild mitral insufficiency  Patient was advised previously to be evaluated and treated aggressively for possible sleep apnea  Patient is now underwent generator change stable  Skin incision clean    The following portions of the patient's history were reviewed and updated as appropriate: Allergies current medications past family history past medical history past social history past surgical history problem list and review of systems  Past Medical History:   Diagnosis Date   • Arm fracture, right 05/2020    Pt seen Dr. Alexander   • Atrial fibrillation (CMS/[facility])    • CHF (congestive heart failure) (CMS/[facility])    • DVT (deep venous thrombosis) (CMS/HCC)     and venous filter   • Hypertension    • Stroke (CMS/[facility])      Past Surgical History:   Procedure Laterality Date   • BARIATRIC SURGERY  2005   • CARDIAC CATHETERIZATION  02/01/2012   • CARDIAC ELECTROPHYSIOLOGY PROCEDURE N/A 7/31/2020    Procedure: PPM generator change - dual;  Surgeon: Valente Duong MD;  Location: [facility] CATH INVASIVE LOCATION;  Service: Cardiovascular;  Laterality: N/A;   • CARDIAC ELECTROPHYSIOLOGY PROCEDURE N/A 7/31/2020    Procedure: Pocket Revision;   "Surgeon: Valente Duong MD;  Location: [facility] CATH INVASIVE LOCATION;  Service: Cardiovascular;  Laterality: N/A;   • CARDIOVERSION      CARDIOVERSION X 3   • CHOLECYSTECTOMY     • GASTRIC BYPASS  2005   • HERNIA REPAIR     • OTHER SURGICAL HISTORY      RF ablation failure because of lack of RA access   • PACEMAKER IMPLANTATION  08/15/2012    permanent dual chamber - medtronic MRI compatible   • TONSILLECTOMY       /76 (BP Location: Left arm, Patient Position: Sitting, Cuff Size: Adult)   Pulse 68   Ht 170.2 cm (67\")   Wt 98.9 kg (218 lb)   LMP  (LMP Unknown)   SpO2 96%   BMI 34.14 kg/m²   Family History   Problem Relation Age of Onset   • Stroke Father    • Stroke Other    • Heart disease Other         FH - MI   • Breast cancer Mother        Current Outpatient Medications:   •  calcium carbonate (OS-MEKA) 600 MG tablet, Take 600 mg by mouth Daily., Disp: , Rfl:   •  esomeprazole (nexIUM) 40 MG capsule, Take 40 mg by mouth Every Morning Before Breakfast., Disp: , Rfl:   •  furosemide (LASIX) 20 MG tablet, TAKE 1 TABLET TWICE A DAY. *MUST CALL DOCTOR OFFICE   TO MAKE AND KEEP           APPOINTMENT*, Disp: 180 tablet, Rfl: 2  •  L-Lysine 500 MG tablet tablet, Take  by mouth Daily., Disp: , Rfl:   •  metoprolol tartrate (LOPRESSOR) 25 MG tablet, Take 25 mg by mouth 2 (Two) Times a Day., Disp: , Rfl:   •  Multiple Vitamin (MULTI-VITAMIN DAILY PO), Take  by mouth., Disp: , Rfl:   •  Omega-3 1000 MG capsule, Take  by mouth., Disp: , Rfl:   •  potassium chloride (K-DUR) 10 MEQ CR tablet, Take 10 mEq by mouth 2 (Two) Times a Day., Disp: , Rfl:   •  warfarin (COUMADIN) 4 MG tablet, TAKE 2 TABLETS BY MOUTH ON SUNDAY, TUESDAY, AND THURSDAY. TAKE 1 AND 1/2 TABLET BY MOUTH ALL OTHER DAYS, OR AS DIRECTED, Disp: 135 tablet, Rfl: 0  Social History     Socioeconomic History   • Marital status:      Spouse name: Not on file   • Number of children: Not on file   • Years of education: Not on file   • Highest " education level: Not on file   Tobacco Use   • Smoking status: Never Smoker   • Smokeless tobacco: Never Used   Substance and Sexual Activity   • Alcohol use: No     Frequency: Never   • Drug use: No     Allergies   Allergen Reactions   • Lactulose Unknown (See Comments)     Unknown       Review of Systems   Constitution: Negative for chills, fever and malaise/fatigue.   Cardiovascular: Negative for chest pain, dyspnea on exertion, leg swelling, palpitations and syncope.   Respiratory: Negative for shortness of breath.    Skin: Negative for rash.   Neurological: Negative for dizziness, light-headedness and numbness.              Objective:     Physical Exam   Constitutional: She is oriented to person, place, and time. She appears well-developed and well-nourished. She is cooperative.   HENT:   Head: Normocephalic and atraumatic.   Mouth/Throat: Uvula is midline and oropharynx is clear and moist. No oral lesions.   Eyes: Conjunctivae are normal. No scleral icterus.   Neck: Trachea normal. Neck supple. No JVD present. Carotid bruit is not present. No thyromegaly present.   Cardiovascular: Normal rate, regular rhythm, S1 normal, S2 normal, normal heart sounds, intact distal pulses and normal pulses. PMI is not displaced. Exam reveals no gallop and no friction rub.   No murmur heard.  Pulmonary/Chest: Effort normal and breath sounds normal.   Abdominal: Soft. Bowel sounds are normal.   Musculoskeletal: Normal range of motion.   Neurological: She is alert and oriented to person, place, and time. She has normal strength.   No focal deficits   Skin: Skin is warm. No cyanosis.   Right chest wall pacemaker generator change noted skin incision clean   Psychiatric: She has a normal mood and affect.       Procedures    Lab Review:       Assessment:          Diagnosis Plan   1. Paroxysmal atrial fibrillation (CMS/HCC)     2. Pacemaker     3. Essential hypertension            Plan:       Continue anticoagulation status post  permanent pacemaker placement with generator change stable

## 2020-08-31 ENCOUNTER — TELEPHONE (OUTPATIENT)
Dept: CARDIOLOGY | Facility: CLINIC | Age: 75
End: 2020-08-31

## 2020-08-31 DIAGNOSIS — Z79.01 LONG TERM (CURRENT) USE OF ANTICOAGULANTS: ICD-10-CM

## 2020-08-31 DIAGNOSIS — I48.0 PAROXYSMAL ATRIAL FIBRILLATION (HCC): ICD-10-CM

## 2020-08-31 RX ORDER — WARFARIN SODIUM 4 MG/1
TABLET ORAL
Qty: 150 TABLET | Refills: 0 | Status: SHIPPED | OUTPATIENT
Start: 2020-08-31 | End: 2020-11-30

## 2020-09-08 ENCOUNTER — ANTICOAGULATION VISIT (OUTPATIENT)
Dept: CARDIOLOGY | Facility: CLINIC | Age: 75
End: 2020-09-08

## 2020-09-08 VITALS
HEART RATE: 91 BPM | DIASTOLIC BLOOD PRESSURE: 73 MMHG | WEIGHT: 214 LBS | BODY MASS INDEX: 33.52 KG/M2 | SYSTOLIC BLOOD PRESSURE: 126 MMHG

## 2020-09-08 DIAGNOSIS — I48.0 PAROXYSMAL ATRIAL FIBRILLATION (HCC): ICD-10-CM

## 2020-09-08 DIAGNOSIS — Z79.01 LONG TERM (CURRENT) USE OF ANTICOAGULANTS: ICD-10-CM

## 2020-09-08 LAB — INR PPP: 2.7 (ref 0.9–1.1)

## 2020-09-08 PROCEDURE — 36416 COLLJ CAPILLARY BLOOD SPEC: CPT | Performed by: INTERNAL MEDICINE

## 2020-09-08 PROCEDURE — 85610 PROTHROMBIN TIME: CPT | Performed by: INTERNAL MEDICINE

## 2020-10-20 ENCOUNTER — ANTICOAGULATION VISIT (OUTPATIENT)
Dept: CARDIOLOGY | Facility: CLINIC | Age: 75
End: 2020-10-20

## 2020-10-20 VITALS
BODY MASS INDEX: 33.52 KG/M2 | HEART RATE: 80 BPM | SYSTOLIC BLOOD PRESSURE: 129 MMHG | DIASTOLIC BLOOD PRESSURE: 79 MMHG | WEIGHT: 214 LBS

## 2020-10-20 DIAGNOSIS — I48.0 PAROXYSMAL ATRIAL FIBRILLATION (HCC): ICD-10-CM

## 2020-10-20 DIAGNOSIS — Z79.01 LONG TERM (CURRENT) USE OF ANTICOAGULANTS: ICD-10-CM

## 2020-10-20 LAB — INR PPP: 3.3 (ref 0.9–1.1)

## 2020-10-20 PROCEDURE — 85610 PROTHROMBIN TIME: CPT | Performed by: INTERNAL MEDICINE

## 2020-10-20 PROCEDURE — 36416 COLLJ CAPILLARY BLOOD SPEC: CPT | Performed by: INTERNAL MEDICINE

## 2020-11-09 RX ORDER — DILTIAZEM HYDROCHLORIDE 180 MG/1
CAPSULE, COATED, EXTENDED RELEASE ORAL
Qty: 90 CAPSULE | Refills: 3 | Status: SHIPPED | OUTPATIENT
Start: 2020-11-09 | End: 2021-10-25 | Stop reason: SDUPTHER

## 2020-11-17 ENCOUNTER — ANTICOAGULATION VISIT (OUTPATIENT)
Dept: CARDIOLOGY | Facility: CLINIC | Age: 75
End: 2020-11-17

## 2020-11-17 VITALS
TEMPERATURE: 97.7 F | HEART RATE: 88 BPM | SYSTOLIC BLOOD PRESSURE: 125 MMHG | WEIGHT: 216 LBS | DIASTOLIC BLOOD PRESSURE: 72 MMHG | BODY MASS INDEX: 33.83 KG/M2

## 2020-11-17 DIAGNOSIS — Z79.01 LONG TERM (CURRENT) USE OF ANTICOAGULANTS: ICD-10-CM

## 2020-11-17 DIAGNOSIS — I48.0 PAROXYSMAL ATRIAL FIBRILLATION (HCC): ICD-10-CM

## 2020-11-17 LAB — INR PPP: 2.6 (ref 0.9–1.1)

## 2020-11-17 PROCEDURE — 85610 PROTHROMBIN TIME: CPT | Performed by: INTERNAL MEDICINE

## 2020-11-17 PROCEDURE — 36416 COLLJ CAPILLARY BLOOD SPEC: CPT | Performed by: INTERNAL MEDICINE

## 2020-11-30 DIAGNOSIS — I48.0 PAROXYSMAL ATRIAL FIBRILLATION (HCC): ICD-10-CM

## 2020-11-30 DIAGNOSIS — Z79.01 LONG TERM (CURRENT) USE OF ANTICOAGULANTS: ICD-10-CM

## 2020-11-30 RX ORDER — WARFARIN SODIUM 4 MG/1
TABLET ORAL
Qty: 150 TABLET | Refills: 0 | Status: SHIPPED | OUTPATIENT
Start: 2020-11-30 | End: 2020-12-17 | Stop reason: SDUPTHER

## 2020-12-17 DIAGNOSIS — I48.0 PAROXYSMAL ATRIAL FIBRILLATION (HCC): ICD-10-CM

## 2020-12-17 DIAGNOSIS — Z79.01 LONG TERM (CURRENT) USE OF ANTICOAGULANTS: ICD-10-CM

## 2020-12-17 RX ORDER — WARFARIN SODIUM 4 MG/1
TABLET ORAL
Qty: 150 TABLET | Refills: 0 | Status: SHIPPED | OUTPATIENT
Start: 2020-12-17 | End: 2021-07-13

## 2020-12-17 NOTE — TELEPHONE ENCOUNTER
Faxed for rec'd for refill request.       Pt last inr 11/17/20 scheduled for repeat 12/22/20     Pt taking 6mg MWFSat and 8mg Sun Tue and Thurs.       Please sign encounter to send refill thanks

## 2020-12-22 ENCOUNTER — ANTICOAGULATION VISIT (OUTPATIENT)
Dept: CARDIOLOGY | Facility: CLINIC | Age: 75
End: 2020-12-22

## 2020-12-22 VITALS
TEMPERATURE: 98.2 F | SYSTOLIC BLOOD PRESSURE: 133 MMHG | WEIGHT: 221 LBS | DIASTOLIC BLOOD PRESSURE: 62 MMHG | HEART RATE: 80 BPM | BODY MASS INDEX: 34.61 KG/M2

## 2020-12-22 DIAGNOSIS — Z79.01 LONG TERM (CURRENT) USE OF ANTICOAGULANTS: ICD-10-CM

## 2020-12-22 LAB — INR PPP: 3.1 (ref 0.9–1.1)

## 2020-12-22 PROCEDURE — 85610 PROTHROMBIN TIME: CPT | Performed by: INTERNAL MEDICINE

## 2020-12-22 PROCEDURE — 36416 COLLJ CAPILLARY BLOOD SPEC: CPT | Performed by: INTERNAL MEDICINE

## 2020-12-28 ENCOUNTER — TELEPHONE (OUTPATIENT)
Dept: CARDIOLOGY | Facility: CLINIC | Age: 75
End: 2020-12-28

## 2020-12-28 NOTE — TELEPHONE ENCOUNTER
1 option is to hold the potassium along with the Lasix for the time being if she is not having any significant swelling  If  finds out the new prescription we can send it

## 2020-12-28 NOTE — TELEPHONE ENCOUNTER
Klor-con too big. Hard for patient to take. Carlo called [facility] and was told we would need to send in new prescription. He would like call back when this has been handled.

## 2020-12-29 RX ORDER — POTASSIUM CHLORIDE 750 MG/1
10 TABLET, FILM COATED, EXTENDED RELEASE ORAL 2 TIMES DAILY
Qty: 180 TABLET | Refills: 1 | Status: CANCELLED | OUTPATIENT
Start: 2020-12-29

## 2020-12-30 RX ORDER — POTASSIUM CHLORIDE 750 MG/1
10 TABLET, FILM COATED, EXTENDED RELEASE ORAL 2 TIMES DAILY
Qty: 180 TABLET | Refills: 1 | Status: SHIPPED | OUTPATIENT
Start: 2020-12-30 | End: 2021-09-17

## 2021-01-19 ENCOUNTER — ANTICOAGULATION VISIT (OUTPATIENT)
Dept: CARDIOLOGY | Facility: CLINIC | Age: 76
End: 2021-01-19

## 2021-01-19 VITALS
DIASTOLIC BLOOD PRESSURE: 85 MMHG | WEIGHT: 232 LBS | TEMPERATURE: 96.9 F | SYSTOLIC BLOOD PRESSURE: 128 MMHG | HEART RATE: 76 BPM | BODY MASS INDEX: 36.34 KG/M2

## 2021-01-19 DIAGNOSIS — Z79.01 LONG TERM (CURRENT) USE OF ANTICOAGULANTS: ICD-10-CM

## 2021-01-19 DIAGNOSIS — I48.91 ATRIAL FIBRILLATION, UNSPECIFIED TYPE (HCC): ICD-10-CM

## 2021-01-19 LAB — INR PPP: 3 (ref 0.9–1.1)

## 2021-01-19 PROCEDURE — 85610 PROTHROMBIN TIME: CPT | Performed by: INTERNAL MEDICINE

## 2021-01-19 PROCEDURE — 36416 COLLJ CAPILLARY BLOOD SPEC: CPT | Performed by: INTERNAL MEDICINE

## 2021-02-23 ENCOUNTER — LAB (OUTPATIENT)
Dept: LAB | Facility: HOSPITAL | Age: 76
End: 2021-02-23

## 2021-02-23 DIAGNOSIS — I25.10 CHRONIC CORONARY ARTERY DISEASE: Primary | ICD-10-CM

## 2021-02-23 LAB
ANION GAP SERPL CALCULATED.3IONS-SCNC: 10.7 MMOL/L (ref 5–15)
BUN SERPL-MCNC: 18 MG/DL (ref 8–23)
BUN/CREAT SERPL: 18.6 (ref 7–25)
CALCIUM SPEC-SCNC: 9.6 MG/DL (ref 8.6–10.5)
CHLORIDE SERPL-SCNC: 103 MMOL/L (ref 98–107)
CHOLEST SERPL-MCNC: 133 MG/DL (ref 0–200)
CK SERPL-CCNC: 46 U/L (ref 20–180)
CO2 SERPL-SCNC: 24.3 MMOL/L (ref 22–29)
CREAT SERPL-MCNC: 0.97 MG/DL (ref 0.57–1)
GFR SERPL CREATININE-BSD FRML MDRD: 56 ML/MIN/1.73
GLUCOSE SERPL-MCNC: 100 MG/DL (ref 65–99)
HDLC SERPL-MCNC: 42 MG/DL (ref 40–60)
LDLC SERPL CALC-MCNC: 73 MG/DL (ref 0–100)
LDLC/HDLC SERPL: 1.72 {RATIO}
POTASSIUM SERPL-SCNC: 4.6 MMOL/L (ref 3.5–5.2)
SODIUM SERPL-SCNC: 138 MMOL/L (ref 136–145)
TRIGL SERPL-MCNC: 93 MG/DL (ref 0–150)
VLDLC SERPL-MCNC: 18 MG/DL (ref 5–40)

## 2021-02-23 PROCEDURE — 82550 ASSAY OF CK (CPK): CPT | Performed by: INTERNAL MEDICINE

## 2021-02-23 PROCEDURE — 80061 LIPID PANEL: CPT | Performed by: INTERNAL MEDICINE

## 2021-02-23 PROCEDURE — 80048 BASIC METABOLIC PNL TOTAL CA: CPT | Performed by: INTERNAL MEDICINE

## 2021-02-23 PROCEDURE — 36415 COLL VENOUS BLD VENIPUNCTURE: CPT | Performed by: INTERNAL MEDICINE

## 2021-03-03 ENCOUNTER — ANTICOAGULATION VISIT (OUTPATIENT)
Dept: CARDIOLOGY | Facility: CLINIC | Age: 76
End: 2021-03-03

## 2021-03-03 ENCOUNTER — CLINICAL SUPPORT NO REQUIREMENTS (OUTPATIENT)
Dept: CARDIOLOGY | Facility: CLINIC | Age: 76
End: 2021-03-03

## 2021-03-03 ENCOUNTER — OFFICE VISIT (OUTPATIENT)
Dept: CARDIOLOGY | Facility: CLINIC | Age: 76
End: 2021-03-03

## 2021-03-03 VITALS
WEIGHT: 226 LBS | BODY MASS INDEX: 35.47 KG/M2 | HEART RATE: 85 BPM | TEMPERATURE: 97.5 F | HEIGHT: 67 IN | DIASTOLIC BLOOD PRESSURE: 77 MMHG | SYSTOLIC BLOOD PRESSURE: 142 MMHG

## 2021-03-03 VITALS
DIASTOLIC BLOOD PRESSURE: 77 MMHG | BODY MASS INDEX: 35.4 KG/M2 | WEIGHT: 226 LBS | SYSTOLIC BLOOD PRESSURE: 142 MMHG | HEART RATE: 85 BPM

## 2021-03-03 DIAGNOSIS — R00.1 BRADYCARDIA: ICD-10-CM

## 2021-03-03 DIAGNOSIS — Z95.0 PACEMAKER: ICD-10-CM

## 2021-03-03 DIAGNOSIS — Z79.01 LONG TERM (CURRENT) USE OF ANTICOAGULANTS: ICD-10-CM

## 2021-03-03 DIAGNOSIS — I48.91 ATRIAL FIBRILLATION, UNSPECIFIED TYPE (HCC): ICD-10-CM

## 2021-03-03 DIAGNOSIS — I48.91 ATRIAL FIBRILLATION, UNSPECIFIED TYPE (HCC): Primary | ICD-10-CM

## 2021-03-03 DIAGNOSIS — E78.2 MIXED HYPERLIPIDEMIA: ICD-10-CM

## 2021-03-03 DIAGNOSIS — Z95.0 PACEMAKER: Primary | ICD-10-CM

## 2021-03-03 DIAGNOSIS — I10 ESSENTIAL HYPERTENSION: ICD-10-CM

## 2021-03-03 LAB — INR PPP: 2.4 (ref 0.9–1.1)

## 2021-03-03 PROCEDURE — 36416 COLLJ CAPILLARY BLOOD SPEC: CPT | Performed by: INTERNAL MEDICINE

## 2021-03-03 PROCEDURE — 99214 OFFICE O/P EST MOD 30 MIN: CPT | Performed by: INTERNAL MEDICINE

## 2021-03-03 PROCEDURE — 93279 PRGRMG DEV EVAL PM/LDLS PM: CPT | Performed by: INTERNAL MEDICINE

## 2021-03-03 PROCEDURE — 85610 PROTHROMBIN TIME: CPT | Performed by: INTERNAL MEDICINE

## 2021-03-03 PROCEDURE — 93000 ELECTROCARDIOGRAM COMPLETE: CPT | Performed by: INTERNAL MEDICINE

## 2021-03-03 RX ORDER — SPIRONOLACTONE 25 MG/1
TABLET ORAL
COMMUNITY
Start: 2021-01-26 | End: 2021-09-17

## 2021-03-03 NOTE — PROGRESS NOTES
Subjective:     Encounter Date:03/03/2021      Patient ID: Jasmin Mehta is a 75 y.o. female.    Chief Complaint: Atrial Fibrillation & Device Check  History of Present Illness     75-year-old white female patient with known history of atrial fibrillation comes back for followup.  patient previously underwent permanent pacemaker placement for tachybrady syndrome. previously she had a cardiac catheterization in 2012,  that  showed,    no evidence of any obstructive coronary artery disease.       Patient on and off is having problems with paroxysmal atrial fibrillation followed by electrophysiology currently on anticoagulation and beta-blockers  EKG today atrial fibrillation with controlled ventricular rate intermittent ventricular pacing noted       May 2019 stress Myoview showed no ischemia and no infarction   May 2019 echocardiogram showed EF 55-60% mild septal hypokinesis RV is mild-to-moderately dilated mild mitral insufficiency  Patient was advised previously to be evaluated and treated aggressively for possible sleep apnea  Pacemaker functioning well she already had generator change last year  Recent labs reviewed well controlled  INR 2.4    The following portions of the patient's history were reviewed and updated as appropriate: Allergies current medications past family history past medical history past social history past surgical history problem list and review of systems  Past Medical History:   Diagnosis Date   • Arm fracture, right 05/2020    Pt seen Dr. Alexander   • Atrial fibrillation (CMS/HCC)    • CHF (congestive heart failure) (CMS/HCC)    • DVT (deep venous thrombosis) (CMS/HCC)     and venous filter   • Hypertension    • Stroke (CMS/HCC)      Past Surgical History:   Procedure Laterality Date   • BARIATRIC SURGERY  2005   • CARDIAC CATHETERIZATION  02/01/2012   • CARDIAC ELECTROPHYSIOLOGY PROCEDURE N/A 7/31/2020    Procedure: PPM generator change - dual;  Surgeon: Valente Duong MD;  Location:  "[facility] CATH INVASIVE LOCATION;  Service: Cardiovascular;  Laterality: N/A;   • CARDIAC ELECTROPHYSIOLOGY PROCEDURE N/A 7/31/2020    Procedure: Pocket Revision;  Surgeon: Valente Duong MD;  Location: [facility] CATH INVASIVE LOCATION;  Service: Cardiovascular;  Laterality: N/A;   • CARDIOVERSION      CARDIOVERSION X 3   • CHOLECYSTECTOMY     • GASTRIC BYPASS  2005   • HERNIA REPAIR     • OTHER SURGICAL HISTORY      RF ablation failure because of lack of RA access   • PACEMAKER IMPLANTATION  08/15/2012    permanent dual chamber - medtronic MRI compatible   • TONSILLECTOMY       /77 (BP Location: Left arm, Patient Position: Sitting, Cuff Size: Adult)   Pulse 85   Temp 97.5 °F (36.4 °C) (Infrared)   Ht 170.2 cm (67\")   Wt 103 kg (226 lb)   LMP  (LMP Unknown)   Breastfeeding No   BMI 35.40 kg/m²   Family History   Problem Relation Age of Onset   • Stroke Father    • Stroke Other    • Heart disease Other         FH - MI   • Breast cancer Mother        Current Outpatient Medications:   •  calcium carbonate (OS-MEAK) 600 MG tablet, Take 600 mg by mouth Daily., Disp: , Rfl:   •  esomeprazole (nexIUM) 40 MG capsule, Take 40 mg by mouth Every Morning Before Breakfast., Disp: , Rfl:   •  L-Lysine 500 MG tablet tablet, Take  by mouth Daily., Disp: , Rfl:   •  metoprolol tartrate (LOPRESSOR) 25 MG tablet, Take 25 mg by mouth 2 (Two) Times a Day., Disp: , Rfl:   •  Multiple Vitamin (MULTI-VITAMIN DAILY PO), Take  by mouth., Disp: , Rfl:   •  Omega-3 1000 MG capsule, Take  by mouth., Disp: , Rfl:   •  warfarin (Jantoven) 4 MG tablet, TAKE 2 TABLETS ON SUNDAY,  TUESDAY, AND THURSDAY; TAKE1 AND 1/2 TABLETS ALL OTHERDAYS, OR AS DIRECTED, Disp: 150 tablet, Rfl: 0  •  dilTIAZem CD (CARDIZEM CD) 180 MG 24 hr capsule, TAKE 1 CAPSULE DAILY, Disp: 90 capsule, Rfl: 3  •  furosemide (LASIX) 20 MG tablet, TAKE 1 TABLET TWICE A DAY. *MUST CALL DOCTOR OFFICE   TO MAKE AND KEEP           APPOINTMENT*, Disp: 180 tablet, Rfl: 2  •  " potassium chloride 10 MEQ CR tablet, Take 1 tablet by mouth 2 (Two) Times a Day., Disp: 180 tablet, Rfl: 1  •  spironolactone (ALDACTONE) 25 MG tablet, , Disp: , Rfl:   Social History     Socioeconomic History   • Marital status:      Spouse name: Not on file   • Number of children: Not on file   • Years of education: Not on file   • Highest education level: Not on file   Tobacco Use   • Smoking status: Never Smoker   • Smokeless tobacco: Never Used   Substance and Sexual Activity   • Alcohol use: No     Frequency: Never   • Drug use: No   • Sexual activity: Defer     Allergies   Allergen Reactions   • Lactulose Unknown (See Comments)     Unknown       Review of Systems   Constitution: Negative for fever and malaise/fatigue.   HENT: Negative for congestion and hearing loss.    Eyes: Negative for double vision and visual disturbance.   Cardiovascular: Negative for chest pain, claudication, dyspnea on exertion, leg swelling and syncope.   Respiratory: Positive for shortness of breath (OCC.). Negative for cough.    Endocrine: Negative for cold intolerance.   Skin: Negative for color change and rash.   Musculoskeletal: Negative for arthritis and joint pain.   Gastrointestinal: Negative for abdominal pain and heartburn.   Genitourinary: Negative for hematuria.   Neurological: Negative for excessive daytime sleepiness and dizziness.   Psychiatric/Behavioral: Negative for depression. The patient is not nervous/anxious.    All other systems reviewed and are negative.             Objective:     Physical Exam  Blood pressure mildly elevated otherwise vital stable neck no JVP elevation lungs bilateral and mostly clear heart sounds S1 is present irregularly irregular abdomen soft nontender extremities no edema    ECG 12 Lead    Date/Time: 3/3/2021 3:02 PM  Performed by: Rylan Burns MD  Authorized by: Rylan Burns MD   Comments: EKG today atrial fibrillation with controlled ventricular rate  intermittent ventricular pacing noted  No significant changes compared to the last EKG            Lab Review:       Assessment:          Diagnosis Plan   1. Atrial fibrillation, unspecified type (CMS/HCC)  CK    Comprehensive Metabolic Panel    Lipid Panel   2. Pacemaker  CK    Comprehensive Metabolic Panel    Lipid Panel   3. Essential hypertension  CK    Comprehensive Metabolic Panel    Lipid Panel   4. Mixed hyperlipidemia  CK    Comprehensive Metabolic Panel    Lipid Panel          Plan:       MDM  Number of Diagnoses or Management Options  Atrial fibrillation, unspecified type (CMS/HCC): established, improving  Essential hypertension: established, improving  Mixed hyperlipidemia: established, improving  Pacemaker: established, improving     Amount and/or Complexity of Data Reviewed  Clinical lab tests: ordered and reviewed  Review and summarize past medical records: yes    Risk of Complications, Morbidity, and/or Mortality  Presenting problems: moderate  Management options: moderate    Patient Progress  Patient progress: stable

## 2021-04-06 ENCOUNTER — ANTICOAGULATION VISIT (OUTPATIENT)
Dept: CARDIOLOGY | Facility: CLINIC | Age: 76
End: 2021-04-06

## 2021-04-06 VITALS
HEART RATE: 74 BPM | SYSTOLIC BLOOD PRESSURE: 138 MMHG | WEIGHT: 226 LBS | DIASTOLIC BLOOD PRESSURE: 80 MMHG | BODY MASS INDEX: 35.4 KG/M2

## 2021-04-06 DIAGNOSIS — I48.91 ATRIAL FIBRILLATION, UNSPECIFIED TYPE (HCC): ICD-10-CM

## 2021-04-06 DIAGNOSIS — Z79.01 LONG TERM (CURRENT) USE OF ANTICOAGULANTS: ICD-10-CM

## 2021-04-06 LAB — INR PPP: 2.5 (ref 0.9–1.1)

## 2021-04-06 PROCEDURE — 85610 PROTHROMBIN TIME: CPT | Performed by: INTERNAL MEDICINE

## 2021-04-06 PROCEDURE — 36416 COLLJ CAPILLARY BLOOD SPEC: CPT | Performed by: INTERNAL MEDICINE

## 2021-05-10 RX ORDER — FUROSEMIDE 20 MG/1
20 TABLET ORAL 2 TIMES DAILY
Qty: 180 TABLET | Refills: 3 | Status: SHIPPED | OUTPATIENT
Start: 2021-05-10 | End: 2021-09-17

## 2021-05-10 RX ORDER — FUROSEMIDE 20 MG/1
20 TABLET ORAL 2 TIMES DAILY
Qty: 30 TABLET | Refills: 0 | Status: SHIPPED | OUTPATIENT
Start: 2021-05-10 | End: 2021-09-13

## 2021-05-11 ENCOUNTER — ANTICOAGULATION VISIT (OUTPATIENT)
Dept: CARDIOLOGY | Facility: CLINIC | Age: 76
End: 2021-05-11

## 2021-05-11 VITALS
DIASTOLIC BLOOD PRESSURE: 87 MMHG | BODY MASS INDEX: 35.87 KG/M2 | HEART RATE: 75 BPM | SYSTOLIC BLOOD PRESSURE: 153 MMHG | WEIGHT: 229 LBS

## 2021-05-11 DIAGNOSIS — Z79.01 LONG TERM (CURRENT) USE OF ANTICOAGULANTS: Primary | ICD-10-CM

## 2021-05-11 LAB — INR PPP: 2.1 (ref 0.9–1.1)

## 2021-05-11 PROCEDURE — 36416 COLLJ CAPILLARY BLOOD SPEC: CPT | Performed by: INTERNAL MEDICINE

## 2021-05-11 PROCEDURE — 85610 PROTHROMBIN TIME: CPT | Performed by: INTERNAL MEDICINE

## 2021-05-24 RX ORDER — FUROSEMIDE 20 MG/1
TABLET ORAL
Qty: 30 TABLET | Refills: 0 | OUTPATIENT
Start: 2021-05-24

## 2021-06-15 ENCOUNTER — ANTICOAGULATION VISIT (OUTPATIENT)
Dept: CARDIOLOGY | Facility: CLINIC | Age: 76
End: 2021-06-15

## 2021-06-15 VITALS
WEIGHT: 215 LBS | HEART RATE: 81 BPM | BODY MASS INDEX: 33.67 KG/M2 | DIASTOLIC BLOOD PRESSURE: 64 MMHG | SYSTOLIC BLOOD PRESSURE: 102 MMHG

## 2021-06-15 DIAGNOSIS — Z79.01 LONG TERM (CURRENT) USE OF ANTICOAGULANTS: Primary | ICD-10-CM

## 2021-06-15 LAB — INR PPP: 2.8 (ref 0.9–1.1)

## 2021-06-15 PROCEDURE — 36416 COLLJ CAPILLARY BLOOD SPEC: CPT | Performed by: INTERNAL MEDICINE

## 2021-06-15 PROCEDURE — 85610 PROTHROMBIN TIME: CPT | Performed by: INTERNAL MEDICINE

## 2021-07-11 DIAGNOSIS — I48.0 PAROXYSMAL ATRIAL FIBRILLATION (HCC): ICD-10-CM

## 2021-07-11 DIAGNOSIS — Z79.01 LONG TERM (CURRENT) USE OF ANTICOAGULANTS: ICD-10-CM

## 2021-07-13 RX ORDER — WARFARIN SODIUM 4 MG/1
TABLET ORAL
Qty: 150 TABLET | Refills: 0 | Status: SHIPPED | OUTPATIENT
Start: 2021-07-13 | End: 2021-10-20 | Stop reason: SDUPTHER

## 2021-07-17 NOTE — TELEPHONE ENCOUNTER
Pt spouse called requesting 90 day refill of cartia xt 180 mg.  [facility].    ACP

## 2021-07-21 ENCOUNTER — ANTICOAGULATION VISIT (OUTPATIENT)
Dept: CARDIOLOGY | Facility: CLINIC | Age: 76
End: 2021-07-21

## 2021-07-21 VITALS
SYSTOLIC BLOOD PRESSURE: 131 MMHG | DIASTOLIC BLOOD PRESSURE: 75 MMHG | WEIGHT: 216 LBS | BODY MASS INDEX: 33.83 KG/M2 | HEART RATE: 72 BPM

## 2021-07-21 DIAGNOSIS — I48.91 ATRIAL FIBRILLATION, UNSPECIFIED TYPE (HCC): Primary | ICD-10-CM

## 2021-07-21 DIAGNOSIS — Z79.01 LONG TERM (CURRENT) USE OF ANTICOAGULANTS: ICD-10-CM

## 2021-07-21 LAB — INR PPP: 2.5 (ref 0.9–1.1)

## 2021-07-21 PROCEDURE — 85610 PROTHROMBIN TIME: CPT | Performed by: INTERNAL MEDICINE

## 2021-07-21 PROCEDURE — 36416 COLLJ CAPILLARY BLOOD SPEC: CPT | Performed by: INTERNAL MEDICINE

## 2021-09-08 ENCOUNTER — TELEPHONE (OUTPATIENT)
Dept: CARDIOLOGY | Facility: CLINIC | Age: 76
End: 2021-09-08

## 2021-09-08 NOTE — TELEPHONE ENCOUNTER
Left detailed message reminding patient that she does need blood work prior to her next OV with Dr. Mortensen.

## 2021-09-10 ENCOUNTER — TELEPHONE (OUTPATIENT)
Dept: ORTHOPEDIC SURGERY | Facility: CLINIC | Age: 76
End: 2021-09-10

## 2021-09-10 NOTE — TELEPHONE ENCOUNTER
Caller: SHARAN GOODWIN    Relationship to patient: Emergency Contact    Best call back number: [phone]    Chief complaint: RIGHT KNEE PAIN     Type of visit: NEW PROBLEM/2ND OPINION     Requested date: ASAP     If rescheduling, when is the original appointment:     Additional notes: SHARAN GOODWIN PATIENT'S  CALLED TO SCHEDULE APPOINTMENT FOR RIGHT KNEE PAIN.  ADVISED PATIENT WAS SEEING ORTHOPEDIC DR. ROGELIO MARTINEZ FOR HER KNEE PAIN. PATIENT NO LONGER WANTS TO BE TREATED BY DR. MARTINEZ.  ADVISED HE WOULD HAVE DR. MARTINEZ OFFICE FAX MEDICAL RECORDS.

## 2021-09-13 ENCOUNTER — LAB (OUTPATIENT)
Dept: LAB | Facility: HOSPITAL | Age: 76
End: 2021-09-13

## 2021-09-13 ENCOUNTER — OFFICE VISIT (OUTPATIENT)
Dept: ORTHOPEDIC SURGERY | Facility: CLINIC | Age: 76
End: 2021-09-13

## 2021-09-13 VITALS
WEIGHT: 222.2 LBS | HEART RATE: 72 BPM | DIASTOLIC BLOOD PRESSURE: 70 MMHG | OXYGEN SATURATION: 98 % | SYSTOLIC BLOOD PRESSURE: 109 MMHG | BODY MASS INDEX: 35.71 KG/M2 | HEIGHT: 66 IN

## 2021-09-13 DIAGNOSIS — I10 ESSENTIAL HYPERTENSION: ICD-10-CM

## 2021-09-13 DIAGNOSIS — E78.2 MIXED HYPERLIPIDEMIA: ICD-10-CM

## 2021-09-13 DIAGNOSIS — E66.01 MORBID OBESITY (HCC): ICD-10-CM

## 2021-09-13 DIAGNOSIS — I48.91 ATRIAL FIBRILLATION, UNSPECIFIED TYPE (HCC): ICD-10-CM

## 2021-09-13 DIAGNOSIS — Z95.0 PACEMAKER: ICD-10-CM

## 2021-09-13 DIAGNOSIS — M17.11 PRIMARY OSTEOARTHRITIS OF RIGHT KNEE: Primary | ICD-10-CM

## 2021-09-13 LAB
ALBUMIN SERPL-MCNC: 4.4 G/DL (ref 3.5–5.2)
ALBUMIN/GLOB SERPL: 1.9 G/DL
ALP SERPL-CCNC: 101 U/L (ref 39–117)
ALT SERPL W P-5'-P-CCNC: 11 U/L (ref 1–33)
ANION GAP SERPL CALCULATED.3IONS-SCNC: 10.1 MMOL/L (ref 5–15)
AST SERPL-CCNC: 14 U/L (ref 1–32)
BILIRUB SERPL-MCNC: 1.3 MG/DL (ref 0–1.2)
BUN SERPL-MCNC: 21 MG/DL (ref 8–23)
BUN/CREAT SERPL: 19.4 (ref 7–25)
CALCIUM SPEC-SCNC: 10 MG/DL (ref 8.6–10.5)
CHLORIDE SERPL-SCNC: 102 MMOL/L (ref 98–107)
CHOLEST SERPL-MCNC: 149 MG/DL (ref 0–200)
CK SERPL-CCNC: 58 U/L (ref 20–180)
CO2 SERPL-SCNC: 28.9 MMOL/L (ref 22–29)
CREAT SERPL-MCNC: 1.08 MG/DL (ref 0.57–1)
GFR SERPL CREATININE-BSD FRML MDRD: 49 ML/MIN/1.73
GLOBULIN UR ELPH-MCNC: 2.3 GM/DL
GLUCOSE SERPL-MCNC: 82 MG/DL (ref 65–99)
HDLC SERPL-MCNC: 50 MG/DL (ref 40–60)
LDLC SERPL CALC-MCNC: 84 MG/DL (ref 0–100)
LDLC/HDLC SERPL: 1.68 {RATIO}
POTASSIUM SERPL-SCNC: 4.7 MMOL/L (ref 3.5–5.2)
PROT SERPL-MCNC: 6.7 G/DL (ref 6–8.5)
SODIUM SERPL-SCNC: 141 MMOL/L (ref 136–145)
TRIGL SERPL-MCNC: 75 MG/DL (ref 0–150)
VLDLC SERPL-MCNC: 15 MG/DL (ref 5–40)

## 2021-09-13 PROCEDURE — 80053 COMPREHEN METABOLIC PANEL: CPT

## 2021-09-13 PROCEDURE — 80061 LIPID PANEL: CPT

## 2021-09-13 PROCEDURE — 99213 OFFICE O/P EST LOW 20 MIN: CPT | Performed by: PHYSICIAN ASSISTANT

## 2021-09-13 PROCEDURE — 82550 ASSAY OF CK (CPK): CPT

## 2021-09-13 PROCEDURE — 36415 COLL VENOUS BLD VENIPUNCTURE: CPT

## 2021-09-13 NOTE — PROGRESS NOTES
ORTHOPEDIC VISIT    Referring Provider: No ref. provider found  Primary Care Provider: Warren August MD         Subjective:  Chief Complaint:  Chief Complaint   Patient presents with   • Right Knee - Pain       HPI:  Jasmin Mehta is a 75 y.o. female who presents for her initial visit for right knee pain that is increased in the last 2 months.  She reports a fall last year when she landed on her knee.  She describes both a dull, achy pain, as well as an intermittent sharp, shooting pain, mainly located over the medial aspect of the knee.  She denies any radiation, numbness, or tingling.  She also denies any mechanical symptoms or instability in the knee.  She is unable to take anti-inflammatories due to being on warfarin.  She denies any previous history of surgery on the knee.  She has had a recent steroid injection, which helped some but only lasted 2 to 4 weeks.    Past Medical History:   Diagnosis Date   • Arm fracture, right 05/2020    Pt seen Dr. Alexander   • Atrial fibrillation (CMS/[facility])    • CHF (congestive heart failure) (CMS/[facility])    • DVT (deep venous thrombosis) (CMS/[facility])     and venous filter   • Hypertension    • Stroke (CMS/[facility])        Past Surgical History:   Procedure Laterality Date   • BARIATRIC SURGERY  2005   • CARDIAC CATHETERIZATION  02/01/2012   • CARDIAC ELECTROPHYSIOLOGY PROCEDURE N/A 7/31/2020    Procedure: PPM generator change - dual;  Surgeon: Valente Duong MD;  Location: [facility] CATH INVASIVE LOCATION;  Service: Cardiovascular;  Laterality: N/A;   • CARDIAC ELECTROPHYSIOLOGY PROCEDURE N/A 7/31/2020    Procedure: Pocket Revision;  Surgeon: Valente Duong MD;  Location: [facility] CATH INVASIVE LOCATION;  Service: Cardiovascular;  Laterality: N/A;   • CARDIOVERSION      CARDIOVERSION X 3   • CHOLECYSTECTOMY     • GASTRIC BYPASS  2005   • HERNIA REPAIR     • OTHER SURGICAL HISTORY      RF ablation failure because of lack of RA access   • PACEMAKER IMPLANTATION  08/15/2012    permanent  dual chamber - medtronic MRI compatible   • TONSILLECTOMY         Family History   Problem Relation Age of Onset   • Stroke Father    • Stroke Other    • Heart disease Other         FH - MI   • Breast cancer Mother        Social History     Occupational History   • Not on file   Tobacco Use   • Smoking status: Never Smoker   • Smokeless tobacco: Never Used   Vaping Use   • Vaping Use: Never used   Substance and Sexual Activity   • Alcohol use: No   • Drug use: No   • Sexual activity: Defer        Medications:    Current Outpatient Medications:   •  calcium carbonate (OS-MEKA) 600 MG tablet, Take 600 mg by mouth Daily., Disp: , Rfl:   •  dilTIAZem CD (CARDIZEM CD) 180 MG 24 hr capsule, TAKE 1 CAPSULE DAILY, Disp: 90 capsule, Rfl: 3  •  esomeprazole (nexIUM) 40 MG capsule, Take 40 mg by mouth Every Morning Before Breakfast., Disp: , Rfl:   •  furosemide (LASIX) 20 MG tablet, Take 1 tablet by mouth 2 (Two) Times a Day., Disp: 180 tablet, Rfl: 3  •  L-Lysine 500 MG tablet tablet, Take  by mouth Daily., Disp: , Rfl:   •  metoprolol tartrate (LOPRESSOR) 25 MG tablet, Take 25 mg by mouth 2 (Two) Times a Day., Disp: , Rfl:   •  Multiple Vitamin (MULTI-VITAMIN DAILY PO), Take  by mouth., Disp: , Rfl:   •  Omega-3 1000 MG capsule, Take  by mouth., Disp: , Rfl:   •  potassium chloride 10 MEQ CR tablet, Take 1 tablet by mouth 2 (Two) Times a Day., Disp: 180 tablet, Rfl: 1  •  spironolactone (ALDACTONE) 25 MG tablet, , Disp: , Rfl:   •  warfarin (Jantoven) 4 MG tablet, TAKE 2 TABLETS ON SUNDAY,  TUESDAY, AND THURSDAY; TAKE1 AND 1/2 TABLETS ALL OTHERDAYS, OR AS DIRECTED, Disp: 150 tablet, Rfl: 0    Allergies:  Allergies   Allergen Reactions   • Lactulose Unknown (See Comments)     Unknown           Review of Systems:  Gen -no fever, chills , sweats, headache   Eyes - no irritation or discharge   ENT -  no ear pain , runny nose , sore throat , difficulty swallowing   Resp - no cough , congestion , excessive expectoration   CVS - no  "chest pain , palpitations.   Abd - no pain , nausea , vomiting , diarrhea   Skin - no rash , lesions.   Neuro - no dizziness    Please see HPI for any other pertinent positives.  All other systems were reviewed and are negative.       Objective   Objective:    /70   Pulse 72   Ht 167.6 cm (66\")   Wt 101 kg (222 lb 3.2 oz)   LMP  (LMP Unknown)   SpO2 98%   BMI 35.86 kg/m²     Physical Examination:  Alert, oriented, obese individual in no acute distress, ambulating unassisted  Right lower extremity shows no erythema, rashes, or open skin lesions. There is a mild amount of swelling. It is grossly well aligned, and the patient is neurovascularly intact distally. The knee is stable to varus and valgus stress, there is no patellar maltracking or crepitus noted, and plantar and dorsiflexion is 5/5. There is mild tenderness to palpation over the medial joint line and with range of motion, which is about 0-120.  Positive Gustavo's.           Imaging:  xrays obtained today  right Knee X-Ray  Indication: Right knee pain  AP, Lateral, [place] views  Findings:Shows moderate to severe tricompartmental DJD, worse in the medial compartment, There is subchondral sclerosis, subchondral cysts, and osteophytosis present. and No fractures or dislocations are appreciated  decreased joint spaces  Hardware appropriately positioned not applicable     no prior studies available for comparison.     This patient's x-ray report was graded according to the Kellgren and Jin classification.  This took into account the joint space narrowing, osteophyte formation, sclerosis of the distal femur/proximal tibia along with deformity of those bones.  The findings were indicative of K L grade 3.     X-RAY was ordered and reviewed by JOVANI Santos          Assessment:  1. Primary osteoarthritis of right knee    2. Morbid obesity (CMS/[facility])                 Plan:  The patient would like to continue conservative treatment options at " this time.  She does have a positive Gustavo's, however any meniscus tear is likely to be degenerative.  Weight loss is highly recommended.  Since she has failed intra-articular steroids, we will try to get viscosupplementation approved.  She will be notified when the injection is available here in the office.  Since she is unable to take oral anti-inflammatories, we will try prescription compounded topical cream.  She will also be fitted for a hinged knee brace to help with stability and fall prevention.  Natural history and expected course discussed. Questions answered.  Educational materials distributed.  Rest, ice, compression, and elevation (RICE) therapy.  OTC analgesics as needed.  cortisone injections  viscosupplementation  physical therapy  bracing  weight loss  activtiy modification  assistive devices    Advance Care Planning   ACP discussion was held with the patient during this visit. Patient has an advance directive (not in EMR), copy requested.               JOVANI Santos  09/13/21  10:07 EDT    EMR Dragon/Transcription disclaimer:  Much of this encounter note is an electronic transcription/translation of spoken language to printed text. The electronic translation of spoken language may permit erroneous, or at times, nonsensical words or phrases to be inadvertently transcribed; Although I have reviewed the note for such errors, some may still exist.

## 2021-09-13 NOTE — PATIENT INSTRUCTIONS
"Osteoarthritis    Osteoarthritis is a type of arthritis. It refers to joint pain or joint disease. Osteoarthritis affects tissue that covers the ends of bones in joints (cartilage). Cartilage acts as a cushion between the bones and helps them move smoothly. Osteoarthritis occurs when cartilage in the joints gets worn down. Osteoarthritis is sometimes called \"wear and tear\" arthritis.  Osteoarthritis is the most common form of arthritis. It often occurs in older people. It is a condition that gets worse over time. The joints most often affected by this condition are in the fingers, toes, hips, knees, and spine, including the neck and lower back.  What are the causes?  This condition is caused by the wearing down of cartilage that covers the ends of bones.  What increases the risk?  The following factors may make you more likely to develop this condition:  · Being age 50 or older.  · Obesity.  · Overuse of joints.  · Past injury of a joint.  · Past surgery on a joint.  · Family history of osteoarthritis.  What are the signs or symptoms?  The main symptoms of this condition are pain, swelling, and stiffness in the joint. Other symptoms may include:  · An enlarged joint.  · More pain and further damage caused by small pieces of bone or cartilage that break off and float inside of the joint.  · Small deposits of bone (osteophytes) that grow on the edges of the joint.  · A grating or scraping feeling inside the joint when you move it.  · Popping or creaking sounds when you move.  · Difficulty walking or exercising.  · An inability to  items, twist your hand(s), or control the movements of your hands and fingers.  How is this diagnosed?  This condition may be diagnosed based on:  · Your medical history.  · A physical exam.  · Your symptoms.  · X-rays of the affected joint(s).  · Blood tests to rule out other types of arthritis.  How is this treated?  There is no cure for this condition, but treatment can help control " pain and improve joint function. Treatment may include a combination of therapies, such as:  · Pain relief techniques, such as:  ? Applying heat and cold to the joint.  ? Massage.  ? A form of talk therapy called cognitive behavioral therapy (CBT). This therapy helps you set goals and follow up on the changes that you make.  · Medicines for pain and inflammation. The medicines can be taken by mouth or applied to the skin. They include:  ? NSAIDs, such as ibuprofen.  ? Prescription medicines.  ? Strong anti-inflammatory medicines (corticosteroids).  ? Certain nutritional supplements.  · A prescribed exercise program. You may work with a physical therapist.  · Assistive devices, such as a brace, wrap, splint, specialized glove, or cane.  · A weight control plan.  · Surgery, such as:  ? An osteotomy. This is done to reposition the bones and relieve pain or to remove loose pieces of bone and cartilage.  ? Joint replacement surgery. You may need this surgery if you have advanced osteoarthritis.  Follow these instructions at home:  Activity  · Rest your affected joints as told by your health care provider.  · Exercise as told by your health care provider. He or she may recommend specific types of exercise, such as:  ? Strengthening exercises. These are done to strengthen the muscles that support joints affected by arthritis.  ? Aerobic activities. These are exercises, such as brisk walking or water aerobics, that increase your heart rate.  ? Range-of-motion activities. These help your joints move more easily.  ? Balance and agility exercises.  Managing pain, stiffness, and swelling         · If directed, apply heat to the affected area as often as told by your health care provider. Use the heat source that your health care provider recommends, such as a moist heat pack or a heating pad.  ? If you have a removable assistive device, remove it as told by your health care provider.  ? Place a towel between your skin and the  heat source. If your health care provider tells you to keep the assistive device on while you apply heat, place a towel between the assistive device and the heat source.  ? Leave the heat on for 20-30 minutes.  ? Remove the heat if your skin turns bright red. This is especially important if you are unable to feel pain, heat, or cold. You may have a greater risk of getting burned.  · If directed, put ice on the affected area. To do this:  ? If you have a removable assistive device, remove it as told by your health care provider.  ? Put ice in a plastic bag.  ? Place a towel between your skin and the bag. If your health care provider tells you to keep the assistive device on during icing, place a towel between the assistive device and the bag.  ? Leave the ice on for 20 minutes, 2-3 times a day.  ? Move your fingers or toes often to reduce stiffness and swelling.  ? Raise (elevate) the injured area above the level of your heart while you are sitting or lying down.  General instructions  · Take over-the-counter and prescription medicines only as told by your health care provider.  · Maintain a healthy weight. Follow instructions from your health care provider for weight control.  · Do not use any products that contain nicotine or tobacco, such as cigarettes, e-cigarettes, and chewing tobacco. If you need help quitting, ask your health care provider.  · Use assistive devices as told by your health care provider.  · Keep all follow-up visits as told by your health care provider. This is important.  Where to find more information  · National [place] of Arthritis and Musculoskeletal and Skin Diseases: www.niams.nih.gov  · National [place] on Aging: www.ozzie.nih.gov  · American College of Rheumatology: www.rheumatology.org  Contact a health care provider if:  · You have redness, swelling, or a feeling of warmth in a joint that gets worse.  · You have a fever along with joint or muscle aches.  · You develop a rash.  · You  have trouble doing your normal activities.  Get help right away if:  · You have pain that gets worse and is not relieved by pain medicine.  Summary  · Osteoarthritis is a type of arthritis that affects tissue covering the ends of bones in joints (cartilage).  · This condition is caused by the wearing down of cartilage that covers the ends of bones.  · The main symptom of this condition is pain, swelling, and stiffness in the joint.  · There is no cure for this condition, but treatment can help control pain and improve joint function.  This information is not intended to replace advice given to you by your health care provider. Make sure you discuss any questions you have with your health care provider.  Document Revised: 12/14/2020 Document Reviewed: 12/14/2020  [employer] Patient Education © 2021 [employer]      Preventing Health Risks of Being Overweight  Maintaining a healthy body weight is an important part of your overall health. Your healthy body weight depends on your age, gender, and height. Being overweight puts you at risk for many health problems, including:  · Heart disease.  · Diabetes.  · Problems sleeping.  · Joint problems.  You can make changes to your diet and lifestyle to prevent these risks. Consider working with a health care provider or a dietitian to make these changes.  What nutrition changes can be made?    · Eat only as much as your body needs. In most cases, this is about 2,000 calories a day, but the amount varies depending on your height, gender, and activity level. Ask your health care provider how many calories you should have each day. Eating more than your body needs on a regular basis can cause you to become overweight or obese.  · Eat slowly, and stop eating when you feel full.  · Choose healthy foods, including:  ? Fruits and vegetables.  ? Lean meats.  ? Low-fat dairy products.  ? High-fiber foods, such as whole grains and beans.  ? Healthy snacks like vegetable sticks, a piece  of fruit, or a small amount of yogurt or cheese.  · Avoid foods and drinks that are high in sugar, salt (sodium), saturated fat, or trans fat. This includes:  ? Many desserts such as candy, cookies, and ice cream.  ? Soda.  ? Fried foods.  ? Processed meats such as hot dogs or lunch meats.  ? Prepackaged snack foods.  What lifestyle changes can be made?    · Exercise for at least 150 minutes a week to prevent weight gain, or as often as recommended by your health care provider. Do moderate-intensity exercise, such as brisk walking.  ? Spread it out by exercising for 30 minutes 5 days a week, or in short 10-minute bursts several times a day.  · Find other ways to stay active and burn calories, such as yard work or a hobby that involves physical activity.  · Get at least 8 hours of sleep each night. When you are well-rested, you are more likely to be active and make healthy choices during the day. To sleep better:  ? Try to go to bed and wake up at about the same time every day.  ? Keep your bedroom dark, quiet, and cool.  ? Make sure that your bed is comfortable.  ? Avoid stimulating activities, such as watching television or exercising, for at least one hour before bedtime.  Why are these changes important?  Eating healthy and being active helps you lose weight and prevent health problems caused by being overweight. Making these changes can also help you manage stress, feel better mentally, and connect with friends and family.  What can happen if changes are not made?  Being overweight can affect you for your entire life. You may develop joint or bone problems that make it painful or difficult for you to play sports or do activities you enjoy. Being overweight puts stress on your heart and lungs and can lead to medical problems like diabetes, heart disease, and sleeping problems.  Where to find support  You can get support for preventing health risks of being overweight from:  · Your health care provider or a  dietitian. They can provide guidance about healthy eating and healthy lifestyle choices.  · Weight loss support groups, online or in-person.  Where to find more information  · MyPlate: www.choosemyplate.gov  ? This an online tool that provides personalized recommendations about foods to eat each day.  · The Centers for Disease Control and Prevention: www.cdc.gov/healthyweight  ? This resource gives tips for managing weight and having an active lifestyle.  Summary  · To prevent unhealthy weight gain, it is important to maintain a healthy diet high in vegetables and whole grains, exercise regularly, and get at least 8 hours of sleep each night.  · Making these changes helps prevent many long-term (chronic) health conditions that can shorten your life, such as diabetes, heart disease, and stroke.  This information is not intended to replace advice given to you by your health care provider. Make sure you discuss any questions you have with your health care provider.  Document Revised: 04/15/2021 Document Reviewed: 04/15/2021  "[employer] Patient Education © 2021 "[employer] Inc.      Advance Care Planning and Advance Directives     You make decisions on a daily basis - decisions about where you want to live, your career, your home, your life. Perhaps one of the most important decisions you face is your choice for future medical care. Take time to talk with your family and your healthcare team and start planning today.  Advance Care Planning is a process that can help you:  · Understand possible future healthcare decisions in light of your own experiences  · Reflect on those decision in light of your goals and values  · Discuss your decisions with those closest to you and the healthcare professionals that care for you  · Make a plan by creating a document that reflects your wishes    Surrogate Decision Maker  In the event of a medical emergency, which has left you unable to communicate or to make your own decisions, you would  need someone to make decisions for you.  It is important to discuss your preferences for medical treatment with this person while you are in good health.     Qualities of a surrogate decision maker:  • Willing to take on this role and responsibility  • Knows what you want for future medical care  • Willing to follow your wishes even if they don't agree with them  • Able to make difficult medical decisions under stressful circumstances    Advance Directives  These are legal documents you can create that will guide your healthcare team and decision maker(s) when needed. These documents can be stored in the electronic medical record.    · Living Will - a legal document to guide your care if you have a terminal condition or a serious illness and are unable to communicate. States vary by statute in document names/types, but most forms may include one or more of the following:        -  Directions regarding life-prolonging treatments        -  Directions regarding artificially provided nutrition/hydration        -  Choosing a healthcare decision maker        -  Direction regarding organ/tissue donation    · Durable Power of  for Healthcare - this document names an -in-fact to make medical decisions for you, but it may also allow this person to make personal and financial decisions for you. Please seek the advice of an  if you need this type of document.    **Advance Directives are not required and no one may discriminate against you if you do not sign one.    Medical Orders  Many states allow specific forms/orders signed by your physician to record your wishes for medical treatment in your current state of health. This form, signed in personal communication with your physician, addresses resuscitation and other medical interventions that you may or may not want.      For more information or to schedule a time with a [facility] Advance Care Planning Facilitator contact: [facility] or  call [phone] and someone will contact you directly.

## 2021-09-17 ENCOUNTER — ANTICOAGULATION VISIT (OUTPATIENT)
Dept: CARDIOLOGY | Facility: CLINIC | Age: 76
End: 2021-09-17

## 2021-09-17 ENCOUNTER — OFFICE VISIT (OUTPATIENT)
Dept: CARDIOLOGY | Facility: CLINIC | Age: 76
End: 2021-09-17

## 2021-09-17 ENCOUNTER — CLINICAL SUPPORT NO REQUIREMENTS (OUTPATIENT)
Dept: CARDIOLOGY | Facility: CLINIC | Age: 76
End: 2021-09-17

## 2021-09-17 VITALS
SYSTOLIC BLOOD PRESSURE: 114 MMHG | WEIGHT: 216 LBS | DIASTOLIC BLOOD PRESSURE: 61 MMHG | BODY MASS INDEX: 34.86 KG/M2 | HEART RATE: 85 BPM

## 2021-09-17 VITALS
WEIGHT: 216 LBS | OXYGEN SATURATION: 98 % | BODY MASS INDEX: 34.72 KG/M2 | SYSTOLIC BLOOD PRESSURE: 114 MMHG | HEIGHT: 66 IN | DIASTOLIC BLOOD PRESSURE: 61 MMHG | HEART RATE: 85 BPM

## 2021-09-17 DIAGNOSIS — Z95.0 PACEMAKER: ICD-10-CM

## 2021-09-17 DIAGNOSIS — I48.91 ATRIAL FIBRILLATION, UNSPECIFIED TYPE (HCC): Primary | ICD-10-CM

## 2021-09-17 DIAGNOSIS — R00.1 BRADYCARDIA: ICD-10-CM

## 2021-09-17 DIAGNOSIS — Z79.01 LONG TERM (CURRENT) USE OF ANTICOAGULANTS: ICD-10-CM

## 2021-09-17 DIAGNOSIS — Z95.0 PACEMAKER: Primary | ICD-10-CM

## 2021-09-17 DIAGNOSIS — I10 ESSENTIAL HYPERTENSION: ICD-10-CM

## 2021-09-17 LAB — INR PPP: 2.5 (ref 0.9–1.1)

## 2021-09-17 PROCEDURE — 93279 PRGRMG DEV EVAL PM/LDLS PM: CPT | Performed by: INTERNAL MEDICINE

## 2021-09-17 PROCEDURE — 85610 PROTHROMBIN TIME: CPT | Performed by: INTERNAL MEDICINE

## 2021-09-17 PROCEDURE — 99213 OFFICE O/P EST LOW 20 MIN: CPT | Performed by: INTERNAL MEDICINE

## 2021-09-17 PROCEDURE — 36416 COLLJ CAPILLARY BLOOD SPEC: CPT | Performed by: INTERNAL MEDICINE

## 2021-09-17 NOTE — PROGRESS NOTES
Subjective:     Encounter Date:09/17/2021      Patient ID: Jasmin Mehta is a 75 y.o. female.    Chief Complaint: Atrial Fibrillation, Hypertension  History of Present Illness     75-year-old white female patient with known history of atrial fibrillation comes back for followup.  patient previously underwent permanent pacemaker placement for tachybrady syndrome. previously she had a cardiac catheterization in 2012,  that  showed,    no evidence of any obstructive coronary artery disease.        Patient on and off is having problems with paroxysmal atrial fibrillation followed by electrophysiology currently on anticoagulation and beta-blockers  EKG today atrial fibrillation with controlled ventricular rate intermittent ventricular pacing noted         May 2019 stress Myoview showed no ischemia and no infarction   May 2019 echocardiogram showed EF 55-60% mild septal hypokinesis RV is mild-to-moderately dilated mild mitral insufficiency  Patient was advised previously to be evaluated and treated aggressively for possible sleep apnea  Pacemaker functioning well she already had generator change last year  Recent labs reviewed well controlled  INR 2.4    Patient comes back for follow-up denies of any active symptoms  Continue anticoagulation INR today 2.5 recent labs reviewed showed creatinine 1.08 total bilirubin mildly elevated otherwise CMP lipids well controlled LDL 84      The following portions of the patient's history were reviewed and updated as appropriate: Allergies current medications past family history past medical history past social history past surgical history problem list and review of systems  Past Medical History:   Diagnosis Date   • Arm fracture, right 05/2020    Pt seen Dr. Alexander   • Arrhythmia    • Atrial fibrillation (CMS/HCC)    • CHF (congestive heart failure) (CMS/HCC)    • DVT (deep venous thrombosis) (CMS/HCC)     and venous filter   • Heart valve disease    • Hypertension    • Stroke  "(CMS/[facility])      Past Surgical History:   Procedure Laterality Date   • BARIATRIC SURGERY  2005   • CARDIAC CATHETERIZATION  02/01/2012   • CARDIAC ELECTROPHYSIOLOGY PROCEDURE N/A 7/31/2020    Procedure: PPM generator change - dual;  Surgeon: Valente Duong MD;  Location: [facility] CATH INVASIVE LOCATION;  Service: Cardiovascular;  Laterality: N/A;   • CARDIAC ELECTROPHYSIOLOGY PROCEDURE N/A 7/31/2020    Procedure: Pocket Revision;  Surgeon: Valente Duong MD;  Location: [facility] CATH INVASIVE LOCATION;  Service: Cardiovascular;  Laterality: N/A;   • CARDIOVERSION      CARDIOVERSION X 3   • CHOLECYSTECTOMY     • GASTRIC BYPASS  2005   • HERNIA REPAIR     • INSERT / REPLACE / REMOVE PACEMAKER     • OTHER SURGICAL HISTORY      RF ablation failure because of lack of RA access   • PACEMAKER IMPLANTATION  08/15/2012    permanent dual chamber - medtronic MRI compatible   • TONSILLECTOMY       /61 (BP Location: Left arm, Patient Position: Sitting)   Pulse 85   Ht 167.6 cm (66\")   Wt 98 kg (216 lb)   LMP  (LMP Unknown)   SpO2 98%   BMI 34.86 kg/m²   Family History   Problem Relation Age of Onset   • Stroke Father    • Stroke Other    • Heart disease Other         FH - MI   • Breast cancer Mother    • Heart attack Mother        Current Outpatient Medications:   •  calcium carbonate (OS-MEKA) 600 MG tablet, Take 600 mg by mouth Daily., Disp: , Rfl:   •  dilTIAZem CD (CARDIZEM CD) 180 MG 24 hr capsule, TAKE 1 CAPSULE DAILY, Disp: 90 capsule, Rfl: 3  •  esomeprazole (nexIUM) 40 MG capsule, Take 40 mg by mouth Every Morning Before Breakfast., Disp: , Rfl:   •  L-Lysine 500 MG tablet tablet, Take  by mouth Daily., Disp: , Rfl:   •  metoprolol tartrate (LOPRESSOR) 25 MG tablet, Take 25 mg by mouth 2 (Two) Times a Day., Disp: , Rfl:   •  Multiple Vitamin (MULTI-VITAMIN DAILY PO), Take  by mouth., Disp: , Rfl:   •  Omega-3 1000 MG capsule, Take  by mouth., Disp: , Rfl:   •  warfarin (Jantoven) 4 MG tablet, TAKE 2 TABLETS " ON SUNDAY,  TUESDAY, AND THURSDAY; TAKE1 AND 1/2 TABLETS ALL OTHERDAYS, OR AS DIRECTED, Disp: 150 tablet, Rfl: 0  Social History     Socioeconomic History   • Marital status:      Spouse name: Not on file   • Number of children: Not on file   • Years of education: Not on file   • Highest education level: Not on file   Tobacco Use   • Smoking status: Never Smoker   • Smokeless tobacco: Never Used   Vaping Use   • Vaping Use: Never used   Substance and Sexual Activity   • Alcohol use: No   • Drug use: No   • Sexual activity: Not Currently     Partners: Male     Birth control/protection: None     Allergies   Allergen Reactions   • Lactulose Unknown (See Comments)     Unknown       Review of Systems   Constitutional: Negative for fever and malaise/fatigue.   Cardiovascular: Negative for chest pain, dyspnea on exertion and palpitations.   Respiratory: Negative for cough and shortness of breath.    Skin: Negative for rash.   Gastrointestinal: Negative for abdominal pain, nausea and vomiting.   Neurological: Negative for focal weakness and headaches.   All other systems reviewed and are negative.             Objective:     Constitutional:       Appearance: Well-developed.   Eyes:      General: No scleral icterus.     Conjunctiva/sclera: Conjunctivae normal.   HENT:      Head: Normocephalic and atraumatic.    Mouth/Throat:      Mouth: No oral lesions.      Pharynx: Uvula midline.   Neck:      Thyroid: No thyromegaly.      Vascular: No carotid bruit or JVD.      Trachea: Trachea normal.   Pulmonary:      Effort: Pulmonary effort is normal.      Breath sounds: Normal breath sounds.   Cardiovascular:      Normal rate. Irregularly irregular rhythm.      No gallop.   Pulses:     Intact distal pulses.   Abdominal:      General: Bowel sounds are normal.      Palpations: Abdomen is soft.   Musculoskeletal: Normal range of motion.      Cervical back: Neck supple. Skin:     General: Skin is warm. There is no cyanosis.    Neurological:      Mental Status: Alert and oriented to person, place, and time.      Comments: No focal deficits   Psychiatric:         Behavior: Behavior is cooperative.         Procedures    Lab Review:       Assessment:          Diagnosis Plan   1. Atrial fibrillation, unspecified type (CMS/HCC)     2. Pacemaker     3. Essential hypertension            Plan:       MDM  Number of Diagnoses or Management Options  Atrial fibrillation, unspecified type (CMS/HCC): established, improving  Essential hypertension: established, improving  Pacemaker: established, improving     Amount and/or Complexity of Data Reviewed  Clinical lab tests: ordered and reviewed  Review and summarize past medical records: yes    Risk of Complications, Morbidity, and/or Mortality  Presenting problems: moderate  Management options: moderate    Patient Progress  Patient progress: stable

## 2021-09-20 ENCOUNTER — TELEPHONE (OUTPATIENT)
Dept: ORTHOPEDIC SURGERY | Facility: CLINIC | Age: 76
End: 2021-09-20

## 2021-10-06 ENCOUNTER — TELEPHONE (OUTPATIENT)
Dept: ORTHOPEDIC SURGERY | Facility: CLINIC | Age: 76
End: 2021-10-06

## 2021-10-06 NOTE — TELEPHONE ENCOUNTER
Patients  Carlo Mehta states pharmacy called and told him Jasmin's gel injection is approved. He would like a call back to talk about next step.     call back [phone]

## 2021-10-06 NOTE — TELEPHONE ENCOUNTER
Spoke with patient's , states they got a call from someone yesterday that it was approved. Advised it was most likely the ins company and that I called in the medication today. Advised that the [facility] pharmacy would be reaching out to them after the benefits were completed and they would have to give permission to have the medication delivered to us. Advised that the [facility] pharmacy would set up delivery with us and once received I would reach out to them to schedule the appt. Patient's  states that they were not aware of this and appreciate the call back.

## 2021-10-19 ENCOUNTER — ANTICOAGULATION VISIT (OUTPATIENT)
Dept: CARDIOLOGY | Facility: CLINIC | Age: 76
End: 2021-10-19

## 2021-10-19 VITALS
BODY MASS INDEX: 35.35 KG/M2 | SYSTOLIC BLOOD PRESSURE: 133 MMHG | WEIGHT: 219 LBS | DIASTOLIC BLOOD PRESSURE: 77 MMHG | HEART RATE: 102 BPM

## 2021-10-19 DIAGNOSIS — Z79.01 LONG TERM (CURRENT) USE OF ANTICOAGULANTS: ICD-10-CM

## 2021-10-19 DIAGNOSIS — I48.11 LONGSTANDING PERSISTENT ATRIAL FIBRILLATION (HCC): Primary | ICD-10-CM

## 2021-10-19 LAB — INR PPP: 2.5 (ref 0.9–1.1)

## 2021-10-20 ENCOUNTER — TELEPHONE (OUTPATIENT)
Dept: CARDIOLOGY | Facility: CLINIC | Age: 76
End: 2021-10-20

## 2021-10-20 DIAGNOSIS — I48.0 PAROXYSMAL ATRIAL FIBRILLATION (HCC): ICD-10-CM

## 2021-10-20 DIAGNOSIS — Z79.01 LONG TERM (CURRENT) USE OF ANTICOAGULANTS: ICD-10-CM

## 2021-10-20 NOTE — TELEPHONE ENCOUNTER
Called LMOM For patient to return call. Need to confirm which physician patient is going to continue care with apLPN

## 2021-10-20 NOTE — TELEPHONE ENCOUNTER
There was some confusion between patient and [facility], the refill request was sent to wrong practice, it does not need a PA. Can you send in her warfarin refills to [facility]?

## 2021-10-21 RX ORDER — WARFARIN SODIUM 4 MG/1
TABLET ORAL
Qty: 150 TABLET | Refills: 0 | Status: SHIPPED | OUTPATIENT
Start: 2021-10-21 | End: 2022-01-31

## 2021-10-25 RX ORDER — DILTIAZEM HYDROCHLORIDE 180 MG/1
180 CAPSULE, COATED, EXTENDED RELEASE ORAL DAILY
Qty: 90 CAPSULE | Refills: 3 | Status: SHIPPED | OUTPATIENT
Start: 2021-10-25 | End: 2022-10-17

## 2021-10-25 NOTE — TELEPHONE ENCOUNTER
Rx Refill Note  Requested Prescriptions     Pending Prescriptions Disp Refills   • dilTIAZem CD (CARDIZEM CD) 180 MG 24 hr capsule 90 capsule 3     Sig: Take 1 capsule by mouth Daily.      Last office visit with prescribing clinician: 09/17/2021      Next office visit with prescribing clinician: 3/15/2022     Comprehensive Metabolic Panel (09/13/2021 08:03)         Martha Espino MA  10/25/21, 16:47 EDT

## 2021-11-01 ENCOUNTER — TELEPHONE (OUTPATIENT)
Dept: ORTHOPEDIC SURGERY | Facility: CLINIC | Age: 76
End: 2021-11-01

## 2021-11-01 NOTE — TELEPHONE ENCOUNTER
Caller: KUSH    Relationship to patient: [facility]    Best call back number: [phone]     Patient is needing: KUSH FROM [facility] CALLED TO SEE WHEN THE BEST DAY FOR DELIVERY OF THE PATIENT'S DUROLANE WOULD BE.    PLEASE CALL THE NUMBER ABOVE AND ADVISE.    HUB UNABLE TO TRANSFER.

## 2021-11-01 NOTE — TELEPHONE ENCOUNTER
Call placed to [facility], s/w Jeannette, general note stating that they have not gotten ahold of the patient. Advised that I had gotten a phone call that it was ready to deliver and that I needed to speak with someone else as they did not call us without cause. Was placed on hold then hung up on.    Call placed to patient to ask if they had received my message about contacting [facility] about scheduling delivery of the Durolane. States yes and that they did call them. Advised I would call the [facility] pharmacy back and get it figured out.     Call back to [facility], spoke with Deb, advised of previous conversation. States that she shows where the Durolane scheduled for delivery on 11/3/2021 to our office address. Thanked her for her help.

## 2021-11-03 ENCOUNTER — OFFICE VISIT (OUTPATIENT)
Dept: ORTHOPEDIC SURGERY | Facility: CLINIC | Age: 76
End: 2021-11-03

## 2021-11-03 VITALS
WEIGHT: 221.2 LBS | HEIGHT: 66 IN | SYSTOLIC BLOOD PRESSURE: 106 MMHG | DIASTOLIC BLOOD PRESSURE: 73 MMHG | HEART RATE: 84 BPM | BODY MASS INDEX: 35.55 KG/M2

## 2021-11-03 DIAGNOSIS — E66.01 MORBID OBESITY (HCC): ICD-10-CM

## 2021-11-03 DIAGNOSIS — M17.11 PRIMARY OSTEOARTHRITIS OF RIGHT KNEE: Primary | ICD-10-CM

## 2021-11-03 PROCEDURE — 20610 DRAIN/INJ JOINT/BURSA W/O US: CPT | Performed by: PHYSICIAN ASSISTANT

## 2021-11-03 RX ORDER — LIDOCAINE HYDROCHLORIDE 10 MG/ML
3 INJECTION, SOLUTION EPIDURAL; INFILTRATION; INTRACAUDAL; PERINEURAL
Status: COMPLETED | OUTPATIENT
Start: 2021-11-03 | End: 2021-11-03

## 2021-11-03 RX ORDER — SPIRONOLACTONE 25 MG/1
TABLET ORAL
COMMUNITY
Start: 2021-10-20

## 2021-11-03 RX ORDER — FUROSEMIDE 20 MG/1
20 TABLET ORAL 2 TIMES DAILY
COMMUNITY
Start: 2021-11-01 | End: 2022-04-25 | Stop reason: SDUPTHER

## 2021-11-03 RX ADMIN — LIDOCAINE HYDROCHLORIDE 3 ML: 10 INJECTION, SOLUTION EPIDURAL; INFILTRATION; INTRACAUDAL; PERINEURAL at 14:21

## 2021-11-03 NOTE — PROGRESS NOTES
ORTHO FOLLOW UP       Subjective:    HPI:   Jasmin Mehta is a 76 y.o. female who presents in follow-up for her right knee pain with a known history of right knee DJD.  She presents for right knee Durolane injection.      Past Medical History:   Diagnosis Date   • Arm fracture, right 05/2020    Pt seen Dr. Alexander   • Arrhythmia    • Atrial fibrillation (HCC)    • CHF (congestive heart failure) (HCC)    • Chronic coronary artery disease    • DVT (deep venous thrombosis) (HCC)     and venous filter   • Heart valve disease    • Hypertension    • Primary osteoarthritis of right knee 9/13/2021   • Stroke (HCC)        Past Surgical History:   Procedure Laterality Date   • BARIATRIC SURGERY  2005   • CARDIAC CATHETERIZATION  02/01/2012   • CARDIAC ELECTROPHYSIOLOGY PROCEDURE N/A 7/31/2020    Procedure: PPM generator change - dual;  Surgeon: Valente Duong MD;  Location: [facility] CATH INVASIVE LOCATION;  Service: Cardiovascular;  Laterality: N/A;   • CARDIAC ELECTROPHYSIOLOGY PROCEDURE N/A 7/31/2020    Procedure: Pocket Revision;  Surgeon: Valente Duong MD;  Location: [facility] CATH INVASIVE LOCATION;  Service: Cardiovascular;  Laterality: N/A;   • CARDIOVERSION      CARDIOVERSION X 3   • CHOLECYSTECTOMY     • GASTRIC BYPASS  2005   • HERNIA REPAIR     • INSERT / REPLACE / REMOVE PACEMAKER     • OTHER SURGICAL HISTORY      RF ablation failure because of lack of RA access   • PACEMAKER IMPLANTATION  08/15/2012    permanent dual chamber - medtronic MRI compatible   • TONSILLECTOMY         Social History     Occupational History   • Not on file   Tobacco Use   • Smoking status: Never Smoker   • Smokeless tobacco: Never Used   Vaping Use   • Vaping Use: Never used   Substance and Sexual Activity   • Alcohol use: No   • Drug use: No   • Sexual activity: Not Currently     Partners: Male     Birth control/protection: None      The following portions of the patient's history were reviewed and updated as appropriate: allergies,  "current medications, past family history, past medical history, past social history, past surgical history and problem list.    Medications:    Current Outpatient Medications:   •  calcium carbonate (OS-MEKA) 600 MG tablet, Take 600 mg by mouth Daily., Disp: , Rfl:   •  dilTIAZem CD (CARDIZEM CD) 180 MG 24 hr capsule, Take 1 capsule by mouth Daily., Disp: 90 capsule, Rfl: 3  •  esomeprazole (nexIUM) 40 MG capsule, Take 40 mg by mouth Every Morning Before Breakfast., Disp: , Rfl:   •  furosemide (LASIX) 20 MG tablet, , Disp: , Rfl:   •  L-Lysine 500 MG tablet tablet, Take  by mouth Daily., Disp: , Rfl:   •  metoprolol tartrate (LOPRESSOR) 25 MG tablet, Take 25 mg by mouth 2 (Two) Times a Day., Disp: , Rfl:   •  Multiple Vitamin (MULTI-VITAMIN DAILY PO), Take  by mouth., Disp: , Rfl:   •  Omega-3 1000 MG capsule, Take  by mouth., Disp: , Rfl:   •  spironolactone (ALDACTONE) 25 MG tablet, , Disp: , Rfl:   •  warfarin (Jantoven) 4 MG tablet, TAKE 2 TABLETS ON SUNDAY,  TUESDAY, AND THURSDAY; TAKE1 AND 1/2 TABLETS ALL OTHERDAYS, OR AS DIRECTED, Disp: 150 tablet, Rfl: 0    Allergies:  Allergies   Allergen Reactions   • Lactose Intolerance (Gi) GI Intolerance     Severe stomach pain   • Lactulose GI Intolerance     Severe stomach pain       Review of Systems:  Gen -no fever, chills , sweats, headache   Eyes - no irritation or discharge   ENT -  no ear pain , runny nose , sore throat , difficulty swallowing   Resp - no cough , congestion , excessive expectoration   CVS - no chest pain , palpitations.   Abd - no pain , nausea , vomiting , diarrhea   Skin - no rash , lesions.   Neuro - no dizziness    Please see HPI for any other pertinent positives.  All other systems were reviewed and are negative.       Objective   Objective:    /73 (BP Location: Right arm, Patient Position: Sitting, Cuff Size: Large Adult)   Pulse 84   Ht 167.6 cm (66\")   Wt 100 kg (221 lb 3.2 oz)   LMP  (LMP Unknown)   BMI 35.70 kg/m² "     Physical Examination:  Alert, oriented, obese individual in no acute distress, ambulating unassisted  Right lower extremity shows no erythema, rashes, or open skin lesions. There is a mild amount of swelling. It is grossly well aligned, and the patient is neurovascularly intact distally. The knee is stable to varus and valgus stress, there is no patellar maltracking or crepitus noted, and plantar and dorsiflexion is 5/5. There is mild tenderness to palpation over the medial joint line and with range of motion, which is about 0-120.         Imaging:  no diagnostic testing performed this visit            Assessment:  1. Primary osteoarthritis of right knee    2. Morbid obesity (HCC)                 Plan:  Right knee Durolane injection today from specialty pharmacy.  Risks and benefits were discussed and postinjection instructions were given.  Weight loss is highly recommended.  She may follow-up as needed.  Large Joint Arthrocentesis: R knee  Date/Time: 11/3/2021 2:21 PM  Consent given by: patient  Timeout: Immediately prior to procedure a time out was called to verify the correct patient, procedure, equipment, support staff and site/side marked as required   Supporting Documentation  Indications: pain   Procedure Details  Location: knee - R knee  Preparation: Patient was prepped and draped in the usual sterile fashion  Needle size: 22 G  Approach: anterolateral  Medications administered: 60 mg Sodium Hyaluronate 60 MG/3ML; 3 mL lidocaine PF 1% 1 %  Patient tolerance: patient tolerated the procedure well with no immediate complications      After consent was obtained and a time out was properly performed, the right knee was prepped with alcohol and chlorhexidine. A 25 gauge needle was used to inject 3 cc of 1% lidocaine, following this a 22 gauge needle was used to inject one syringe of Durolane. Sterile technique was used and the patient tolerated the procedure well.             Yvonne Haro,  PA  11/03/21  14:20 EDT    EMR Dragon/Transcription disclaimer:  Much of this encounter note is an electronic transcription/translation of spoken language to printed text. The electronic translation of spoken language may permit erroneous, or at times, nonsensical words or phrases to be inadvertently transcribed; Although I have reviewed the note for such errors, some may still exist.   Emergency Dept.

## 2021-11-03 NOTE — PATIENT INSTRUCTIONS
Knee Injection  A knee injection is a procedure to get medicine into your knee joint to relieve the pain, swelling, and stiffness of arthritis. Your health care provider uses a needle to inject medicine, which may also help to lubricate and cushion your knee joint. You may need more than one injection.  Tell a health care provider about:  · Any allergies you have.  · All medicines you are taking, including vitamins, herbs, eye drops, creams, and over-the-counter medicines.  · Any problems you or family members have had with anesthetic medicines.  · Any blood disorders you have.  · Any surgeries you have had.  · Any medical conditions you have.  · Whether you are pregnant or may be pregnant.  What are the risks?  Generally, this is a safe procedure. However, problems may occur, including:  · Infection.  · Bleeding.  · Symptoms that get worse.  · Damage to the area around your knee.  · Allergic reaction to any of the medicines.  · Skin reactions from repeated injections.  What happens before the procedure?  · Ask your health care provider about:  ? Changing or stopping your regular medicines. This is especially important if you are taking diabetes medicines or blood thinners.  ? Taking medicines such as aspirin and ibuprofen. These medicines can thin your blood. Do not take these medicines unless your health care provider tells you to take them.  ? Taking over-the-counter medicines, vitamins, herbs, and supplements.  · Plan to have a responsible adult take you home from the hospital or clinic.  What happens during the procedure?    · You will sit or lie down in a position for your knee to be treated.  · The skin over your kneecap will be cleaned with a germ-killing soap.  · You will be given a medicine that numbs the area (local anesthetic). You may feel some stinging.  · The medicine will be injected into your knee. The needle is carefully placed between your kneecap and your knee. The medicine is injected into the  joint space.  · The needle will be removed at the end of the procedure.  · A bandage (dressing) may be placed over the injection site.  The procedure may vary among health care providers and hospitals.  What can I expect after the procedure?  · Your blood pressure, heart rate, breathing rate, and blood oxygen level will be monitored until you leave the hospital or clinic.  · You may have to move your knee through its full range of motion. This helps to get all the medicine into your joint space.  · You will be watched to make sure that you do not have a reaction to the injected medicine.  · You may feel more pain, swelling, and warmth than you did before the injection. This reaction may last about 1-2 days.  Follow these instructions at home:  Medicines  · Take over-the-counter and prescription medicines only as told by your health care provider.  · Ask your health care provider if the medicine prescribed to you requires you to avoid driving or using machinery.  · Do not take medicines such as aspirin and ibuprofen unless your health care provider tells you to take them.  Injection site care  · Follow instructions from your health care provider about:  ? How to take care of your puncture site.  ? When and how you should change your dressing.  ? When you should remove your dressing.  · Check your injection area every day for signs of infection. Check for:  ? More redness, swelling, or pain after 2 days.  ? Fluid or blood.  ? Pus or a bad smell.  ? Warmth.  Managing pain, stiffness, and swelling    · If directed, put ice on the injection area. To do this:  ? Put ice in a plastic bag.  ? Place a towel between your skin and the bag.  ? Leave the ice on for 20 minutes, 2-3 times per day.  ? Remove the ice if your skin turns bright red. This is very important. If you cannot feel pain, heat, or cold, you have a greater risk of damage to the area.  · Do not apply heat to your knee.  · Raise (elevate) the injection area  above the level of your heart while you are sitting or lying down.    General instructions  · If you were given a dressing, keep it dry until your health care provider says it can be removed. Ask your health care provider when you can start showering or bathing.  · Avoid strenuous activities for as long as directed by your health care provider. Ask your health care provider when you can return to your normal activities.  · Keep all follow-up visits. This is important. You may need more injections.  Contact a health care provider if you have:  · A fever.  · Warmth in your injection area.  · Fluid, blood, or pus coming from your injection site.  · Symptoms at your injection site that last longer than 2 days after your procedure.  Get help right away if:  · Your knee turns very red.  · Your knee becomes very swollen.  · Your knee is in severe pain.  Summary  · A knee injection is a procedure to get medicine into your knee joint to relieve the pain, swelling, and stiffness of arthritis.  · A needle is carefully placed between your kneecap and your knee to inject medicine into the joint space.  · Before the procedure, ask your health care provider about changing or stopping your regular medicines, especially if you are taking diabetes medicines or blood thinners.  · Contact your health care provider if you have any problems or questions after your procedure.  This information is not intended to replace advice given to you by your health care provider. Make sure you discuss any questions you have with your health care provider.  Document Revised: 06/02/2021 Document Reviewed: 06/02/2021  Elsevier Patient Education © 2021 [employer]      Preventing Health Risks of Being Overweight  Maintaining a healthy body weight is an important part of your overall health. Your healthy body weight depends on your age, gender, and height. Being overweight puts you at risk for many health problems, including:  · Heart  disease.  · Diabetes.  · Problems sleeping.  · Joint problems.  You can make changes to your diet and lifestyle to prevent these risks. Consider working with a health care provider or a dietitian to make these changes.  What nutrition changes can be made?    · Eat only as much as your body needs. In most cases, this is about 2,000 calories a day, but the amount varies depending on your height, gender, and activity level. Ask your health care provider how many calories you should have each day. Eating more than your body needs on a regular basis can cause you to become overweight or obese.  · Eat slowly, and stop eating when you feel full.  · Choose healthy foods, including:  ? Fruits and vegetables.  ? Lean meats.  ? Low-fat dairy products.  ? High-fiber foods, such as whole grains and beans.  ? Healthy snacks like vegetable sticks, a piece of fruit, or a small amount of yogurt or cheese.  · Avoid foods and drinks that are high in sugar, salt (sodium), saturated fat, or trans fat. This includes:  ? Many desserts such as candy, cookies, and ice cream.  ? Soda.  ? Fried foods.  ? Processed meats such as hot dogs or lunch meats.  ? Prepackaged snack foods.  What lifestyle changes can be made?    · Exercise for at least 150 minutes a week to prevent weight gain, or as often as recommended by your health care provider. Do moderate-intensity exercise, such as brisk walking.  ? Spread it out by exercising for 30 minutes 5 days a week, or in short 10-minute bursts several times a day.  · Find other ways to stay active and burn calories, such as yard work or a hobby that involves physical activity.  · Get at least 8 hours of sleep each night. When you are well-rested, you are more likely to be active and make healthy choices during the day. To sleep better:  ? Try to go to bed and wake up at about the same time every day.  ? Keep your bedroom dark, quiet, and cool.  ? Make sure that your bed is comfortable.  ? Avoid  stimulating activities, such as watching television or exercising, for at least one hour before bedtime.  Why are these changes important?  Eating healthy and being active helps you lose weight and prevent health problems caused by being overweight. Making these changes can also help you manage stress, feel better mentally, and connect with friends and family.  What can happen if changes are not made?  Being overweight can affect you for your entire life. You may develop joint or bone problems that make it painful or difficult for you to play sports or do activities you enjoy. Being overweight puts stress on your heart and lungs and can lead to medical problems like diabetes, heart disease, and sleeping problems.  Where to find support  You can get support for preventing health risks of being overweight from:  · Your health care provider or a dietitian. They can provide guidance about healthy eating and healthy lifestyle choices.  · Weight loss support groups, online or in-person.  Where to find more information  · MyPlate: www.choosemyplate.gov  ? This an online tool that provides personalized recommendations about foods to eat each day.  · The Centers for Disease Control and Prevention: www.cdc.gov/healthyweight  ? This resource gives tips for managing weight and having an active lifestyle.  Summary  · To prevent unhealthy weight gain, it is important to maintain a healthy diet high in vegetables and whole grains, exercise regularly, and get at least 8 hours of sleep each night.  · Making these changes helps prevent many long-term (chronic) health conditions that can shorten your life, such as diabetes, heart disease, and stroke.  This information is not intended to replace advice given to you by your health care provider. Make sure you discuss any questions you have with your health care provider.  Document Revised: 04/15/2021 Document Reviewed: 04/15/2021  Elsevier Patient Education © 2021 Elsevier Inc.

## 2021-11-23 ENCOUNTER — ANTICOAGULATION VISIT (OUTPATIENT)
Dept: CARDIOLOGY | Facility: CLINIC | Age: 76
End: 2021-11-23

## 2021-11-23 VITALS
SYSTOLIC BLOOD PRESSURE: 113 MMHG | DIASTOLIC BLOOD PRESSURE: 73 MMHG | WEIGHT: 221 LBS | BODY MASS INDEX: 35.67 KG/M2 | HEART RATE: 76 BPM

## 2021-11-23 DIAGNOSIS — Z79.01 LONG TERM (CURRENT) USE OF ANTICOAGULANTS: Primary | ICD-10-CM

## 2021-11-23 LAB — INR PPP: 3.1 (ref 0.9–1.1)

## 2021-11-23 PROCEDURE — 85610 PROTHROMBIN TIME: CPT | Performed by: INTERNAL MEDICINE

## 2021-11-23 PROCEDURE — 36416 COLLJ CAPILLARY BLOOD SPEC: CPT | Performed by: INTERNAL MEDICINE

## 2021-12-21 ENCOUNTER — ANTICOAGULATION VISIT (OUTPATIENT)
Dept: CARDIOLOGY | Facility: CLINIC | Age: 76
End: 2021-12-21

## 2021-12-21 VITALS
WEIGHT: 218 LBS | DIASTOLIC BLOOD PRESSURE: 87 MMHG | SYSTOLIC BLOOD PRESSURE: 108 MMHG | BODY MASS INDEX: 35.19 KG/M2 | HEART RATE: 87 BPM

## 2021-12-21 DIAGNOSIS — I48.91 ATRIAL FIBRILLATION, UNSPECIFIED TYPE (HCC): Primary | ICD-10-CM

## 2021-12-21 DIAGNOSIS — Z79.01 LONG TERM (CURRENT) USE OF ANTICOAGULANTS: ICD-10-CM

## 2021-12-21 LAB — INR PPP: 2.3 (ref 0.9–1.1)

## 2021-12-21 PROCEDURE — 36416 COLLJ CAPILLARY BLOOD SPEC: CPT | Performed by: INTERNAL MEDICINE

## 2021-12-21 PROCEDURE — 85610 PROTHROMBIN TIME: CPT | Performed by: INTERNAL MEDICINE

## 2022-01-25 ENCOUNTER — ANTICOAGULATION VISIT (OUTPATIENT)
Dept: CARDIOLOGY | Facility: CLINIC | Age: 77
End: 2022-01-25

## 2022-01-25 VITALS
WEIGHT: 219 LBS | SYSTOLIC BLOOD PRESSURE: 125 MMHG | BODY MASS INDEX: 35.35 KG/M2 | HEART RATE: 83 BPM | DIASTOLIC BLOOD PRESSURE: 60 MMHG

## 2022-01-25 DIAGNOSIS — Z79.01 LONG TERM (CURRENT) USE OF ANTICOAGULANTS: ICD-10-CM

## 2022-01-25 DIAGNOSIS — I48.11 LONGSTANDING PERSISTENT ATRIAL FIBRILLATION: Primary | ICD-10-CM

## 2022-01-25 LAB — INR PPP: 2.5 (ref 0.9–1.1)

## 2022-01-25 PROCEDURE — 36416 COLLJ CAPILLARY BLOOD SPEC: CPT | Performed by: INTERNAL MEDICINE

## 2022-01-25 PROCEDURE — 85610 PROTHROMBIN TIME: CPT | Performed by: INTERNAL MEDICINE

## 2022-01-31 DIAGNOSIS — Z79.01 LONG TERM (CURRENT) USE OF ANTICOAGULANTS: ICD-10-CM

## 2022-01-31 DIAGNOSIS — I48.0 PAROXYSMAL ATRIAL FIBRILLATION: ICD-10-CM

## 2022-01-31 RX ORDER — WARFARIN SODIUM 4 MG/1
TABLET ORAL
Qty: 150 TABLET | Refills: 0 | Status: SHIPPED | OUTPATIENT
Start: 2022-01-31 | End: 2022-05-23

## 2022-01-31 NOTE — TELEPHONE ENCOUNTER
Rx Refill Note  Requested Prescriptions     Pending Prescriptions Disp Refills   • warfarin (Jantoven) 4 MG tablet [Pharmacy Med Name: JANTOVEN TAB 4MG] 150 tablet 0     Sig: TAKE 2 TABLETS ON SUNDAY,  TUESDAY, AND THURSDAY; TAKE1 AND 1/2 TABLETS ALL OTHERDAYS, OR AS DIRECTED      Last office visit with prescribing clinician: Visit date not found      Next office visit with prescribing clinician: 3/15/2022              Anticoagulation Visit (01/25/2022)      Pau Cohen LPN  01/31/22, 16:45 EST  
Initiate Treatment: clobetasol 0.05 % shampoo \\nOnce a day to scalp in bath, let sit then rinse.\\n\\nclobetasol 0.05 % scalp solution \\nApply to the affected areas on the scalp once daily as needed for rash, itching, redness
Detail Level: Simple
Discontinue Regimen: Avoid scratching scalp\\nHydrocortisone cream to scalp

## 2022-03-15 ENCOUNTER — ANTICOAGULATION VISIT (OUTPATIENT)
Dept: CARDIOLOGY | Facility: CLINIC | Age: 77
End: 2022-03-15

## 2022-03-15 ENCOUNTER — OFFICE VISIT (OUTPATIENT)
Dept: CARDIOLOGY | Facility: CLINIC | Age: 77
End: 2022-03-15

## 2022-03-15 VITALS
DIASTOLIC BLOOD PRESSURE: 49 MMHG | SYSTOLIC BLOOD PRESSURE: 113 MMHG | WEIGHT: 221 LBS | HEART RATE: 83 BPM | BODY MASS INDEX: 35.67 KG/M2

## 2022-03-15 VITALS
WEIGHT: 221 LBS | HEIGHT: 66 IN | HEART RATE: 83 BPM | SYSTOLIC BLOOD PRESSURE: 113 MMHG | BODY MASS INDEX: 35.52 KG/M2 | DIASTOLIC BLOOD PRESSURE: 49 MMHG

## 2022-03-15 DIAGNOSIS — Z79.01 LONG TERM (CURRENT) USE OF ANTICOAGULANTS: Primary | ICD-10-CM

## 2022-03-15 DIAGNOSIS — I48.20 CHRONIC ATRIAL FIBRILLATION: Primary | ICD-10-CM

## 2022-03-15 DIAGNOSIS — Z95.0 PACEMAKER: ICD-10-CM

## 2022-03-15 DIAGNOSIS — I48.0 PAROXYSMAL ATRIAL FIBRILLATION: ICD-10-CM

## 2022-03-15 DIAGNOSIS — Z79.01 LONG TERM (CURRENT) USE OF ANTICOAGULANTS: ICD-10-CM

## 2022-03-15 LAB — INR PPP: 2.5 (ref 0.9–1.1)

## 2022-03-15 PROCEDURE — 36416 COLLJ CAPILLARY BLOOD SPEC: CPT | Performed by: INTERNAL MEDICINE

## 2022-03-15 PROCEDURE — 85610 PROTHROMBIN TIME: CPT | Performed by: INTERNAL MEDICINE

## 2022-03-15 PROCEDURE — 99213 OFFICE O/P EST LOW 20 MIN: CPT | Performed by: INTERNAL MEDICINE

## 2022-03-15 PROCEDURE — 93000 ELECTROCARDIOGRAM COMPLETE: CPT | Performed by: INTERNAL MEDICINE

## 2022-03-15 NOTE — PROGRESS NOTES
HP      Name: Jasmin Mehta ADMIT: (Not on file)   : 1945  PCP: Warren August MD    MRN: [id number] LOS: 0 days   AGE/SEX: 76 y.o. female  ROOM: Room/bed info not found     Chief Complaint   Patient presents with   • Atrial Fibrillation     6 MO F/U       Subjective        History of present illness  Jasmin Mehta is a 76-year-old female patient who was atrial fibrillation which is chronic at this time, dual-chamber pacemaker with recent generator change out, placed in VVIR mode due to chronic A. fib, also hypertension, history of DVT, here for follow-up.  Patient is doing well denies having any chest pain or shortness of breath, no palpitations no lower extremity edema no syncopal episodes.  Overall she feels well.  No recent hospitalizations.    Past Medical History:   Diagnosis Date   • Arm fracture, right 2020    Pt seen Dr. Alexander   • Arrhythmia    • Atrial fibrillation (HCC)    • CHF (congestive heart failure) (HCC)    • Chronic coronary artery disease    • DVT (deep venous thrombosis) ([facility])     and venous filter   • Heart valve disease    • Hypertension    • Primary osteoarthritis of right knee 2021   • Stroke (HCC)      Past Surgical History:   Procedure Laterality Date   • BARIATRIC SURGERY     • CARDIAC CATHETERIZATION  2012   • CARDIAC ELECTROPHYSIOLOGY PROCEDURE N/A 2020    Procedure: PPM generator change - dual;  Surgeon: Valente Duong MD;  Location: [facility] CATH INVASIVE LOCATION;  Service: Cardiovascular;  Laterality: N/A;   • CARDIAC ELECTROPHYSIOLOGY PROCEDURE N/A 2020    Procedure: Pocket Revision;  Surgeon: Valente Duong MD;  Location: [facility] CATH INVASIVE LOCATION;  Service: Cardiovascular;  Laterality: N/A;   • CARDIOVERSION      CARDIOVERSION X 3   • CHOLECYSTECTOMY     • GASTRIC BYPASS     • HERNIA REPAIR     • INSERT / REPLACE / REMOVE PACEMAKER     • OTHER SURGICAL HISTORY      RF ablation failure because of lack of RA access   • PACEMAKER  IMPLANTATION  08/15/2012    permanent dual chamber - medtronic MRI compatible   • TONSILLECTOMY       Family History   Problem Relation Age of Onset   • Stroke Father    • Stroke Other    • Heart disease Other         FH - MI   • Breast cancer Mother    • Heart attack Mother      Social History     Tobacco Use   • Smoking status: Never Smoker   • Smokeless tobacco: Never Used   Vaping Use   • Vaping Use: Never used   Substance Use Topics   • Alcohol use: No   • Drug use: No     (Not in a hospital admission)    Allergies:  Lactose intolerance (gi) and Lactulose    Review of systems    Constitutional: Negative.    Respiratory and cardiovascular: As detailed in HPI section.  Gastrointestinal: Negative for constipation, nausea and vomiting negative for abdominal distention, abdominal pain and diarrhea.   Genitourinary: Negative for difficulty urinating and flank pain.   Musculoskeletal: Negative for arthralgias, joint swelling and myalgias.   Skin: Negative for color change, rash and wound.   Neurological: Negative for dizziness, syncope, weakness and headaches.   Hematological: Negative for adenopathy.   Psychiatric/Behavioral: Negative for confusion.   All other systems reviewed and are negative.    Physical Exam  VITALS REVIEWED    General:      well developed, in no acute distress.    Head:      normocephalic and atraumatic.    Eyes:      PERRL/EOM intact, conjunctiva and sclera clear with out nystagmus.    Neck:      no masses, thyromegaly,  trachea central with normal respiratory effort and PMI displaced laterally  Lungs:      Clear to auscultation bilaterally  Heart:       Irregular rhythm  Msk:      no deformity or scoliosis noted of thoracic or lumbar spine.    Pulses:      pulses normal in all 4 extremities.    Extremities:       No lower extremity edema  Neurologic:      no focal deficits.   alert oriented x3  Skin:      intact without lesions or rashes.    Psych:      alert and cooperative; normal mood and  affect; normal attention span and concentration.      Result Review :               Pertinent cardiac workup    1. Stress test 5/21/2019.  Low risk ejection fraction 60%          ECG 12 Lead    Date/Time: 3/15/2022 12:50 PM  Performed by: Bassem Diallo MD  Authorized by: Bassem Diallo MD   Comparison: compared with previous ECG   Similar to previous ECG  Rhythm: atrial fibrillation  Rate: normal  BPM: 72  Conduction: conduction normal  ST Segments: ST segments normal  QRS axis: normal  Other findings: non-specific ST-T wave changes  Comments: A. fib with occasional ventricular pacing.                Assessment and Plan      Jasmin Mehta is a 76-year-old female patient who has atrial fibrillation which is chronic at this time, dual-chamber pacemaker, placed in VVIR mode due to chronic A. fib, here for follow-up.  Patient is doing well denies any chest pain or shortness of breath, pacemaker interrogation showed appropriate function, no significant rapid ventricular rate on histogram.  For now we will continue same therapy.  She is adequately anticoagulated with Coumadin.  We will see her in follow-up in 6 months.    Diagnoses and all orders for this visit:    1. Chronic atrial fibrillation (HCC) (Primary)    2. Long term (current) use of anticoagulants [Z79.01]    3. Pacemaker    Other orders  -     ECG 12 Lead           Return in about 6 months (around 9/15/2022).  Patient was given instructions and counseling regarding her condition or for health maintenance advice. Please see specific information pulled into the AVS if appropriate.

## 2022-04-25 RX ORDER — FUROSEMIDE 20 MG/1
20 TABLET ORAL 2 TIMES DAILY
Qty: 180 TABLET | Refills: 2 | Status: SHIPPED | OUTPATIENT
Start: 2022-04-25 | End: 2023-02-06

## 2022-04-25 NOTE — TELEPHONE ENCOUNTER
Rx Refill Note  Requested Prescriptions      No prescriptions requested or ordered in this encounter      Last office visit with prescribing clinician: 3/15/2022      Next office visit with prescribing clinician: 9/6/2022            Michelle Rizo MA  04/25/22, 08:57 EDT

## 2022-04-26 ENCOUNTER — ANTICOAGULATION VISIT (OUTPATIENT)
Dept: CARDIOLOGY | Facility: CLINIC | Age: 77
End: 2022-04-26

## 2022-04-26 VITALS
SYSTOLIC BLOOD PRESSURE: 111 MMHG | DIASTOLIC BLOOD PRESSURE: 73 MMHG | BODY MASS INDEX: 35.99 KG/M2 | WEIGHT: 223 LBS | HEART RATE: 69 BPM

## 2022-04-26 DIAGNOSIS — Z79.01 LONG TERM (CURRENT) USE OF ANTICOAGULANTS: ICD-10-CM

## 2022-04-26 DIAGNOSIS — I48.11 LONGSTANDING PERSISTENT ATRIAL FIBRILLATION: Primary | ICD-10-CM

## 2022-04-26 LAB — INR PPP: 2.3 (ref 0.9–1.1)

## 2022-04-26 PROCEDURE — 85610 PROTHROMBIN TIME: CPT | Performed by: INTERNAL MEDICINE

## 2022-04-26 PROCEDURE — 36416 COLLJ CAPILLARY BLOOD SPEC: CPT | Performed by: INTERNAL MEDICINE

## 2022-05-23 DIAGNOSIS — Z79.01 LONG TERM (CURRENT) USE OF ANTICOAGULANTS: ICD-10-CM

## 2022-05-23 DIAGNOSIS — I48.0 PAROXYSMAL ATRIAL FIBRILLATION: ICD-10-CM

## 2022-05-23 RX ORDER — WARFARIN SODIUM 4 MG/1
TABLET ORAL
Qty: 150 TABLET | Refills: 0 | Status: SHIPPED | OUTPATIENT
Start: 2022-05-23 | End: 2022-09-19

## 2022-05-23 NOTE — TELEPHONE ENCOUNTER
Rx Refill Note  Requested Prescriptions     Pending Prescriptions Disp Refills   • warfarin (Jantoven) 4 MG tablet [Pharmacy Med Name: JANTOVEN TAB 4MG] 150 tablet 0     Sig: TAKE 2 TABLETS ON SUNDAY,  TUESDAY, AND THURSDAY; TAKE1 AND 1/2 TABLETS ALL OTHERDAYS, OR AS DIRECTED      Last office visit with prescribing clinician: 3/15/2022      Next office visit with prescribing clinician: 9/6/2022              Anticoagulation Visit (04/26/2022)      Pau Cohen LPN  05/23/22, 17:33 EDT

## 2022-05-31 ENCOUNTER — ANTICOAGULATION VISIT (OUTPATIENT)
Dept: CARDIOLOGY | Facility: CLINIC | Age: 77
End: 2022-05-31

## 2022-05-31 VITALS
WEIGHT: 222 LBS | SYSTOLIC BLOOD PRESSURE: 124 MMHG | DIASTOLIC BLOOD PRESSURE: 65 MMHG | BODY MASS INDEX: 35.83 KG/M2 | HEART RATE: 79 BPM

## 2022-05-31 DIAGNOSIS — Z79.01 LONG TERM (CURRENT) USE OF ANTICOAGULANTS: ICD-10-CM

## 2022-05-31 DIAGNOSIS — I48.11 LONGSTANDING PERSISTENT ATRIAL FIBRILLATION: Primary | ICD-10-CM

## 2022-05-31 LAB — INR PPP: 1.9 (ref 0.9–1.1)

## 2022-05-31 PROCEDURE — 85610 PROTHROMBIN TIME: CPT | Performed by: INTERNAL MEDICINE

## 2022-05-31 PROCEDURE — 36416 COLLJ CAPILLARY BLOOD SPEC: CPT | Performed by: INTERNAL MEDICINE

## 2022-07-05 ENCOUNTER — ANTICOAGULATION VISIT (OUTPATIENT)
Dept: CARDIOLOGY | Facility: CLINIC | Age: 77
End: 2022-07-05

## 2022-07-05 VITALS
DIASTOLIC BLOOD PRESSURE: 62 MMHG | BODY MASS INDEX: 34.7 KG/M2 | HEART RATE: 69 BPM | SYSTOLIC BLOOD PRESSURE: 108 MMHG | WEIGHT: 215 LBS

## 2022-07-05 DIAGNOSIS — Z79.01 LONG TERM (CURRENT) USE OF ANTICOAGULANTS: Primary | ICD-10-CM

## 2022-07-05 LAB — INR PPP: 2.2 (ref 0.9–1.1)

## 2022-07-05 PROCEDURE — 36416 COLLJ CAPILLARY BLOOD SPEC: CPT | Performed by: INTERNAL MEDICINE

## 2022-07-05 PROCEDURE — 85610 PROTHROMBIN TIME: CPT | Performed by: INTERNAL MEDICINE

## 2022-08-09 ENCOUNTER — ANTICOAGULATION VISIT (OUTPATIENT)
Dept: CARDIOLOGY | Facility: CLINIC | Age: 77
End: 2022-08-09

## 2022-08-09 VITALS
HEART RATE: 68 BPM | BODY MASS INDEX: 34.86 KG/M2 | SYSTOLIC BLOOD PRESSURE: 132 MMHG | DIASTOLIC BLOOD PRESSURE: 63 MMHG | WEIGHT: 216 LBS

## 2022-08-09 DIAGNOSIS — I48.20 CHRONIC ATRIAL FIBRILLATION: Primary | ICD-10-CM

## 2022-08-09 DIAGNOSIS — Z79.01 LONG TERM (CURRENT) USE OF ANTICOAGULANTS: ICD-10-CM

## 2022-08-09 LAB — INR PPP: 1.7 (ref 0.9–1.1)

## 2022-08-09 PROCEDURE — 36416 COLLJ CAPILLARY BLOOD SPEC: CPT | Performed by: INTERNAL MEDICINE

## 2022-08-09 PROCEDURE — 85610 PROTHROMBIN TIME: CPT | Performed by: INTERNAL MEDICINE

## 2022-09-06 ENCOUNTER — ANTICOAGULATION VISIT (OUTPATIENT)
Dept: CARDIOLOGY | Facility: CLINIC | Age: 77
End: 2022-09-06

## 2022-09-06 ENCOUNTER — OFFICE VISIT (OUTPATIENT)
Dept: CARDIOLOGY | Facility: CLINIC | Age: 77
End: 2022-09-06

## 2022-09-06 ENCOUNTER — CLINICAL SUPPORT NO REQUIREMENTS (OUTPATIENT)
Dept: CARDIOLOGY | Facility: CLINIC | Age: 77
End: 2022-09-06

## 2022-09-06 VITALS
BODY MASS INDEX: 34.86 KG/M2 | HEART RATE: 78 BPM | SYSTOLIC BLOOD PRESSURE: 145 MMHG | DIASTOLIC BLOOD PRESSURE: 69 MMHG | HEIGHT: 66 IN

## 2022-09-06 VITALS — DIASTOLIC BLOOD PRESSURE: 69 MMHG | SYSTOLIC BLOOD PRESSURE: 145 MMHG | HEART RATE: 78 BPM

## 2022-09-06 DIAGNOSIS — Z95.0 PACEMAKER: ICD-10-CM

## 2022-09-06 DIAGNOSIS — Z79.01 LONG TERM (CURRENT) USE OF ANTICOAGULANTS: ICD-10-CM

## 2022-09-06 DIAGNOSIS — R00.1 BRADYCARDIA: ICD-10-CM

## 2022-09-06 DIAGNOSIS — Z95.0 PACEMAKER: Primary | ICD-10-CM

## 2022-09-06 DIAGNOSIS — I48.20 CHRONIC ATRIAL FIBRILLATION: Primary | ICD-10-CM

## 2022-09-06 LAB — INR PPP: 2.3 (ref 0.9–1.1)

## 2022-09-06 PROCEDURE — 93279 PRGRMG DEV EVAL PM/LDLS PM: CPT | Performed by: INTERNAL MEDICINE

## 2022-09-06 PROCEDURE — 36416 COLLJ CAPILLARY BLOOD SPEC: CPT | Performed by: INTERNAL MEDICINE

## 2022-09-06 PROCEDURE — 93000 ELECTROCARDIOGRAM COMPLETE: CPT | Performed by: INTERNAL MEDICINE

## 2022-09-06 PROCEDURE — 85610 PROTHROMBIN TIME: CPT | Performed by: INTERNAL MEDICINE

## 2022-09-06 PROCEDURE — 99213 OFFICE O/P EST LOW 20 MIN: CPT | Performed by: INTERNAL MEDICINE

## 2022-09-06 NOTE — PROGRESS NOTES
Progress note      Name: Jasmin Mehta ADMIT: (Not on file)   : 1945  PCP: Warren August MD    MRN: [id number] LOS: 0 days   AGE/SEX: 76 y.o. female  ROOM: Room/bed info not found     Chief Complaint   Patient presents with   • Follow-up     6 mo   • Atrial Fibrillation       Subjective       History of present illness  Jasmin Mehta is a 76-year-old female patient who was atrial fibrillation which is chronic at this time, dual-chamber pacemaker placed in VVIR mode due to chronic A. fib, also hypertension, history of DVT, here for follow-up.  Patient is doing well denies having any chest pain or shortness of breath, no palpitations no lower extremity edema no syncopal episodes.  Overall she feels well.  No recent hospitalizations.    Past Medical History:   Diagnosis Date   • Arm fracture, right 2020    Pt seen Dr. Alexander   • Arrhythmia    • Atrial fibrillation (HCC)    • CHF (congestive heart failure) ([facility])    • Chronic coronary artery disease    • DVT (deep venous thrombosis) ([facility])     and venous filter   • Heart valve disease    • Hypertension    • Primary osteoarthritis of right knee 2021   • Stroke (HCC)      Past Surgical History:   Procedure Laterality Date   • BARIATRIC SURGERY     • CARDIAC CATHETERIZATION  2012   • CARDIAC ELECTROPHYSIOLOGY PROCEDURE N/A 2020    Procedure: PPM generator change - dual;  Surgeon: Valente Duong MD;  Location: [facility] CATH INVASIVE LOCATION;  Service: Cardiovascular;  Laterality: N/A;   • CARDIAC ELECTROPHYSIOLOGY PROCEDURE N/A 2020    Procedure: Pocket Revision;  Surgeon: Valente Duong MD;  Location: [facility] CATH INVASIVE LOCATION;  Service: Cardiovascular;  Laterality: N/A;   • CARDIOVERSION      CARDIOVERSION X 3   • CHOLECYSTECTOMY     • GASTRIC BYPASS     • HERNIA REPAIR     • INSERT / REPLACE / REMOVE PACEMAKER     • OTHER SURGICAL HISTORY      RF ablation failure because of lack of RA access   • PACEMAKER IMPLANTATION   08/15/2012    permanent dual chamber - medtronic MRI compatible   • TONSILLECTOMY       Family History   Problem Relation Age of Onset   • Stroke Father    • Stroke Other    • Heart disease Other         FH - MI   • Breast cancer Mother    • Heart attack Mother      Social History     Tobacco Use   • Smoking status: Never Smoker   • Smokeless tobacco: Never Used   Vaping Use   • Vaping Use: Never used   Substance Use Topics   • Alcohol use: Never   • Drug use: Never     (Not in a hospital admission)    Allergies:  Lactose intolerance (gi) and Lactulose      Physical Exam  VITALS REVIEWED    General:      well developed, in no acute distress.    Head:      normocephalic and atraumatic.    Eyes:      PERRL/EOM intact, conjunctiva and sclera clear with out nystagmus.    Neck:      no masses, thyromegaly,  trachea central with normal respiratory effort and PMI displaced laterally  Lungs:      Clear to auscultation bilaterally  Heart:       Irregular rhythm  Msk:      no deformity or scoliosis noted of thoracic or lumbar spine.    Pulses:      pulses normal in all 4 extremities.    Extremities:       No lower extremity edema  Neurologic:      no focal deficits.   alert oriented x3  Skin:      intact without lesions or rashes.    Psych:      alert and cooperative; normal mood and affect; normal attention span and concentration.      Result Review :               Pertinent cardiac workup    1. Stress test 5/21/2019.  Low risk ejection fraction 60%          ECG 12 Lead    Date/Time: 9/6/2022 12:47 PM  Performed by: Bassem Diallo MD  Authorized by: Bassem Diallo MD   Comparison: compared with previous ECG   Rhythm comments: A. fib with occasional ventricular pacing.  Rate: normal  BPM: 69  Conduction: conduction normal  ST Segments: ST segments normal  QRS axis: normal  Other findings: non-specific ST-T wave changes                Assessment and Plan      Jasmin Mehta  is a 76-year-old female patient who has  atrial fibrillation which is chronic at this time, dual-chamber pacemaker, placed in VVIR mode due to chronic A. fib, here for follow-up.  Patient is doing well denies any chest pain or shortness of breath, pacemaker interrogation showed appropriate function, no significant rapid ventricular rate on histogram.  For now we will continue same therapy.  She is adequately anticoagulated with Coumadin.  We will see her in follow-up in 6 months.    Diagnoses and all orders for this visit:    1. Chronic atrial fibrillation (HCC) (Primary)    2. Long term (current) use of anticoagulants [Z79.01]    3. Pacemaker           Return in about 6 months (around 3/6/2023).  Patient was given instructions and counseling regarding her condition or for health maintenance advice. Please see specific information pulled into the AVS if appropriate.

## 2022-09-18 DIAGNOSIS — I48.0 PAROXYSMAL ATRIAL FIBRILLATION: ICD-10-CM

## 2022-09-18 DIAGNOSIS — Z79.01 LONG TERM (CURRENT) USE OF ANTICOAGULANTS: ICD-10-CM

## 2022-09-19 RX ORDER — WARFARIN SODIUM 4 MG/1
TABLET ORAL
Qty: 150 TABLET | Refills: 0 | Status: SHIPPED | OUTPATIENT
Start: 2022-09-19 | End: 2023-01-03

## 2022-09-19 NOTE — TELEPHONE ENCOUNTER
Rx Refill Note  Requested Prescriptions     Pending Prescriptions Disp Refills   • warfarin (Jantoven) 4 MG tablet [Pharmacy Med Name: JANTOVEN TAB 4MG] 150 tablet 0     Sig: TAKE 2 TABLETS ON SUNDAY,  TUESDAY, AND THURSDAY; TAKE1 AND 1/2 TABLETS ALL OTHERDAYS, OR AS DIRECTED      Last office visit with prescribing clinician: 9/6/2022      Next office visit with prescribing clinician: 3/24/2023              Anticoagulation Visit (09/06/2022)      Pau Cohen LPN  09/19/22, 16:19 EDT

## 2022-10-17 RX ORDER — DILTIAZEM HYDROCHLORIDE 180 MG/1
CAPSULE, COATED, EXTENDED RELEASE ORAL
Qty: 90 CAPSULE | Refills: 3 | Status: SHIPPED | OUTPATIENT
Start: 2022-10-17

## 2022-10-17 NOTE — TELEPHONE ENCOUNTER
Rx Refill Note  Requested Prescriptions     Pending Prescriptions Disp Refills   • dilTIAZem CD (CARDIZEM CD) 180 MG 24 hr capsule [Pharmacy Med Name: DILTIAZEM CD CAP 180MG/24] 90 capsule 3     Sig: TAKE 1 CAPSULE DAILY      Last office visit with prescribing clinician: 9/6/2022      Next office visit with prescribing clinician: 3/24/2023            Lisa Medellin MA  10/17/22, 08:28 EDT

## 2022-10-18 ENCOUNTER — ANTICOAGULATION VISIT (OUTPATIENT)
Dept: CARDIOLOGY | Facility: CLINIC | Age: 77
End: 2022-10-18

## 2022-10-18 VITALS
BODY MASS INDEX: 35.83 KG/M2 | WEIGHT: 222 LBS | SYSTOLIC BLOOD PRESSURE: 123 MMHG | HEART RATE: 73 BPM | DIASTOLIC BLOOD PRESSURE: 81 MMHG

## 2022-10-18 DIAGNOSIS — I48.20 CHRONIC ATRIAL FIBRILLATION: Primary | ICD-10-CM

## 2022-10-18 DIAGNOSIS — Z79.01 LONG TERM (CURRENT) USE OF ANTICOAGULANTS: ICD-10-CM

## 2022-10-18 LAB — INR PPP: 2.6 (ref 0.9–1.1)

## 2022-10-18 PROCEDURE — 36416 COLLJ CAPILLARY BLOOD SPEC: CPT | Performed by: NURSE PRACTITIONER

## 2022-10-18 PROCEDURE — 85610 PROTHROMBIN TIME: CPT | Performed by: NURSE PRACTITIONER

## 2022-11-29 ENCOUNTER — ANTICOAGULATION VISIT (OUTPATIENT)
Dept: CARDIOLOGY | Facility: CLINIC | Age: 77
End: 2022-11-29

## 2022-11-29 VITALS
WEIGHT: 224 LBS | SYSTOLIC BLOOD PRESSURE: 121 MMHG | BODY MASS INDEX: 36.15 KG/M2 | DIASTOLIC BLOOD PRESSURE: 65 MMHG | HEART RATE: 72 BPM

## 2022-11-29 DIAGNOSIS — I48.20 CHRONIC ATRIAL FIBRILLATION: Primary | ICD-10-CM

## 2022-11-29 DIAGNOSIS — Z79.01 LONG TERM (CURRENT) USE OF ANTICOAGULANTS: ICD-10-CM

## 2022-11-29 LAB — INR PPP: 4 (ref 0.9–1.1)

## 2022-11-29 PROCEDURE — 85610 PROTHROMBIN TIME: CPT | Performed by: NURSE PRACTITIONER

## 2022-11-29 PROCEDURE — 36416 COLLJ CAPILLARY BLOOD SPEC: CPT | Performed by: NURSE PRACTITIONER

## 2022-12-13 ENCOUNTER — ANTICOAGULATION VISIT (OUTPATIENT)
Dept: CARDIOLOGY | Facility: CLINIC | Age: 77
End: 2022-12-13

## 2022-12-13 VITALS — WEIGHT: 219 LBS | BODY MASS INDEX: 35.35 KG/M2

## 2022-12-13 DIAGNOSIS — Z79.01 LONG TERM (CURRENT) USE OF ANTICOAGULANTS: ICD-10-CM

## 2022-12-13 DIAGNOSIS — I48.20 CHRONIC ATRIAL FIBRILLATION: Primary | ICD-10-CM

## 2022-12-13 LAB — INR PPP: 2.6 (ref 0.9–1.1)

## 2022-12-13 PROCEDURE — 36416 COLLJ CAPILLARY BLOOD SPEC: CPT | Performed by: INTERNAL MEDICINE

## 2022-12-13 PROCEDURE — 85610 PROTHROMBIN TIME: CPT | Performed by: INTERNAL MEDICINE

## 2023-01-02 DIAGNOSIS — Z79.01 LONG TERM (CURRENT) USE OF ANTICOAGULANTS: ICD-10-CM

## 2023-01-02 DIAGNOSIS — I48.0 PAROXYSMAL ATRIAL FIBRILLATION: ICD-10-CM

## 2023-01-03 RX ORDER — WARFARIN SODIUM 4 MG/1
TABLET ORAL
Qty: 150 TABLET | Refills: 0 | Status: SHIPPED | OUTPATIENT
Start: 2023-01-03

## 2023-01-03 NOTE — TELEPHONE ENCOUNTER
Rx Refill Note  Requested Prescriptions     Pending Prescriptions Disp Refills   • warfarin (Jantoven) 4 MG tablet [Pharmacy Med Name: JANTOVEN TAB 4MG] 150 tablet 0     Sig: TAKE 2 TABLETS ON SUNDAY,  TUESDAY, AND THURSDAY; TAKE1 AND 1/2 TABLETS ALL OTHERDAYS, OR AS DIRECTED   Last INR 12/13/22   Last office visit with prescribing clinician: 9/6/2022   Last telemedicine visit with prescribing clinician: 1/17/2023   Next office visit with prescribing clinician: 3/24/2023                         Would you like a call back once the refill request has been completed: [] Yes [] No    If the office needs to give you a call back, can they leave a voicemail: [] Yes [] No    Shahla Abraham RN  01/03/23, 12:35 EST

## 2023-01-17 ENCOUNTER — ANTICOAGULATION VISIT (OUTPATIENT)
Dept: CARDIOLOGY | Facility: CLINIC | Age: 78
End: 2023-01-17
Payer: MEDICARE

## 2023-01-17 VITALS
WEIGHT: 220 LBS | SYSTOLIC BLOOD PRESSURE: 147 MMHG | DIASTOLIC BLOOD PRESSURE: 82 MMHG | BODY MASS INDEX: 35.51 KG/M2 | HEART RATE: 73 BPM

## 2023-01-17 DIAGNOSIS — Z79.01 LONG TERM (CURRENT) USE OF ANTICOAGULANTS: ICD-10-CM

## 2023-01-17 DIAGNOSIS — I48.20 CHRONIC ATRIAL FIBRILLATION: Primary | ICD-10-CM

## 2023-01-17 LAB — INR PPP: 2.4 (ref 0.9–1.1)

## 2023-01-17 PROCEDURE — 85610 PROTHROMBIN TIME: CPT | Performed by: NURSE PRACTITIONER

## 2023-01-17 PROCEDURE — 36416 COLLJ CAPILLARY BLOOD SPEC: CPT | Performed by: NURSE PRACTITIONER

## 2023-02-06 RX ORDER — FUROSEMIDE 20 MG/1
TABLET ORAL
Qty: 180 TABLET | Refills: 2 | Status: SHIPPED | OUTPATIENT
Start: 2023-02-06

## 2023-02-06 NOTE — TELEPHONE ENCOUNTER
Rx Refill Note  Requested Prescriptions     Pending Prescriptions Disp Refills   • furosemide (LASIX) 20 MG tablet [Pharmacy Med Name: FUROSEMIDE TAB 20MG] 180 tablet 2     Sig: TAKE 1 TABLET TWICE A DAY      Last office visit with prescribing clinician: 9/6/2022     Next office visit with prescribing clinician: 3/24/2023                     Michelle Rizo MA  02/06/23, 14:51 EST

## 2023-02-21 ENCOUNTER — ANTICOAGULATION VISIT (OUTPATIENT)
Dept: CARDIOLOGY | Facility: CLINIC | Age: 78
End: 2023-02-21
Payer: MEDICARE

## 2023-02-21 VITALS
SYSTOLIC BLOOD PRESSURE: 121 MMHG | BODY MASS INDEX: 35.83 KG/M2 | HEART RATE: 85 BPM | WEIGHT: 222 LBS | DIASTOLIC BLOOD PRESSURE: 40 MMHG

## 2023-02-21 DIAGNOSIS — Z79.01 LONG TERM (CURRENT) USE OF ANTICOAGULANTS: ICD-10-CM

## 2023-02-21 DIAGNOSIS — I48.20 CHRONIC ATRIAL FIBRILLATION: Primary | ICD-10-CM

## 2023-02-21 LAB — INR PPP: 2.6 (ref 0.9–1.1)

## 2023-02-21 PROCEDURE — 36416 COLLJ CAPILLARY BLOOD SPEC: CPT | Performed by: NURSE PRACTITIONER

## 2023-02-21 PROCEDURE — 85610 PROTHROMBIN TIME: CPT | Performed by: NURSE PRACTITIONER

## 2023-03-21 ENCOUNTER — ANTICOAGULATION VISIT (OUTPATIENT)
Dept: CARDIOLOGY | Facility: CLINIC | Age: 78
End: 2023-03-21
Payer: MEDICARE

## 2023-03-21 ENCOUNTER — OFFICE VISIT (OUTPATIENT)
Dept: CARDIOLOGY | Facility: CLINIC | Age: 78
End: 2023-03-21
Payer: MEDICARE

## 2023-03-21 ENCOUNTER — CLINICAL SUPPORT NO REQUIREMENTS (OUTPATIENT)
Dept: CARDIOLOGY | Facility: CLINIC | Age: 78
End: 2023-03-21
Payer: MEDICARE

## 2023-03-21 VITALS
HEIGHT: 66 IN | SYSTOLIC BLOOD PRESSURE: 106 MMHG | WEIGHT: 222.5 LBS | BODY MASS INDEX: 35.76 KG/M2 | DIASTOLIC BLOOD PRESSURE: 61 MMHG | OXYGEN SATURATION: 94 % | HEART RATE: 72 BPM

## 2023-03-21 VITALS
WEIGHT: 222 LBS | SYSTOLIC BLOOD PRESSURE: 106 MMHG | BODY MASS INDEX: 35.83 KG/M2 | HEART RATE: 72 BPM | DIASTOLIC BLOOD PRESSURE: 61 MMHG

## 2023-03-21 DIAGNOSIS — R00.1 BRADYCARDIA: ICD-10-CM

## 2023-03-21 DIAGNOSIS — Z95.0 PACEMAKER: Primary | ICD-10-CM

## 2023-03-21 DIAGNOSIS — Z79.01 LONG TERM (CURRENT) USE OF ANTICOAGULANTS: ICD-10-CM

## 2023-03-21 DIAGNOSIS — I48.20 CHRONIC ATRIAL FIBRILLATION: Primary | ICD-10-CM

## 2023-03-21 DIAGNOSIS — Z95.0 PACEMAKER: ICD-10-CM

## 2023-03-21 DIAGNOSIS — R60.9 PERIPHERAL EDEMA: ICD-10-CM

## 2023-03-21 LAB — INR PPP: 2.6 (ref 0.9–1.1)

## 2023-03-21 PROCEDURE — 93279 PRGRMG DEV EVAL PM/LDLS PM: CPT | Performed by: INTERNAL MEDICINE

## 2023-03-21 PROCEDURE — 99214 OFFICE O/P EST MOD 30 MIN: CPT | Performed by: INTERNAL MEDICINE

## 2023-03-21 PROCEDURE — 3078F DIAST BP <80 MM HG: CPT | Performed by: INTERNAL MEDICINE

## 2023-03-21 PROCEDURE — 1160F RVW MEDS BY RX/DR IN RCRD: CPT | Performed by: INTERNAL MEDICINE

## 2023-03-21 PROCEDURE — 3074F SYST BP LT 130 MM HG: CPT | Performed by: INTERNAL MEDICINE

## 2023-03-21 PROCEDURE — 93000 ELECTROCARDIOGRAM COMPLETE: CPT | Performed by: INTERNAL MEDICINE

## 2023-03-21 PROCEDURE — 1159F MED LIST DOCD IN RCRD: CPT | Performed by: INTERNAL MEDICINE

## 2023-03-21 PROCEDURE — 36416 COLLJ CAPILLARY BLOOD SPEC: CPT | Performed by: INTERNAL MEDICINE

## 2023-03-21 PROCEDURE — 85610 PROTHROMBIN TIME: CPT | Performed by: INTERNAL MEDICINE

## 2023-03-21 NOTE — PROGRESS NOTES
Progress note      Name: Jasmin Mehta ADMIT: (Not on file)   : 1945  PCP: Warren August MD    MRN: [id number] LOS: 0 days   AGE/SEX: 77 y.o. female  ROOM: Room/bed info not found     Chief Complaint   Patient presents with   • Follow-up     6 mo   • Atrial Fibrillation       Subjective       History of present illness    Jasmin Mehta is a 77-year-old female patient who was atrial fibrillation which is chronic at this time, dual-chamber pacemaker placed in VVIR mode due to chronic A. fib, also hypertension, history of DVT, here for follow-up.  Patient is doing well denies having any chest pain or shortness of breath, no palpitations no lower extremity edema no syncopal episodes.  Overall she feels well.  No recent hospitalizations.    Past Medical History:   Diagnosis Date   • Arm fracture, right 2020    Pt seen Dr. Alexander   • Arrhythmia    • Atrial fibrillation (HCC)    • CHF (congestive heart failure) ([facility])    • Chronic coronary artery disease    • DVT (deep venous thrombosis) ([facility])     and venous filter   • Heart valve disease    • Hypertension    • Primary osteoarthritis of right knee 2021   • Stroke (HCC)      Past Surgical History:   Procedure Laterality Date   • BARIATRIC SURGERY     • CARDIAC CATHETERIZATION  2012   • CARDIAC ELECTROPHYSIOLOGY PROCEDURE N/A 2020    Procedure: PPM generator change - dual;  Surgeon: Valente Duong MD;  Location: [facility] CATH INVASIVE LOCATION;  Service: Cardiovascular;  Laterality: N/A;   • CARDIAC ELECTROPHYSIOLOGY PROCEDURE N/A 2020    Procedure: Pocket Revision;  Surgeon: Valente Duong MD;  Location: [facility] CATH INVASIVE LOCATION;  Service: Cardiovascular;  Laterality: N/A;   • CARDIOVERSION      CARDIOVERSION X 3   • CHOLECYSTECTOMY     • GASTRIC BYPASS     • HERNIA REPAIR     • INSERT / REPLACE / REMOVE PACEMAKER     • OTHER SURGICAL HISTORY      RF ablation failure because of lack of RA access   • PACEMAKER IMPLANTATION   08/15/2012    permanent dual chamber - medtronic MRI compatible   • TONSILLECTOMY       Family History   Problem Relation Age of Onset   • Stroke Father    • Stroke Other    • Heart disease Other         FH - MI   • Breast cancer Mother    • Heart attack Mother      Social History     Tobacco Use   • Smoking status: Never   • Smokeless tobacco: Never   Vaping Use   • Vaping Use: Never used   Substance Use Topics   • Alcohol use: Never   • Drug use: Never     (Not in a hospital admission)    Allergies:  Lactose intolerance (gi) and Lactulose      Physical Exam  VITALS REVIEWED    General:      well developed, in no acute distress.    Head:      normocephalic and atraumatic.    Eyes:      PERRL/EOM intact, conjunctiva and sclera clear with out nystagmus.    Neck:      no masses, thyromegaly,  trachea central with normal respiratory effort and PMI displaced laterally  Lungs:      Clear to auscultation bilaterally  Heart:       Irregular rhythm  Msk:      no deformity or scoliosis noted of thoracic or lumbar spine.    Pulses:      pulses normal in all 4 extremities.    Extremities:       No lower extremity edema  Neurologic:      no focal deficits.   alert oriented x3  Skin:      intact without lesions or rashes.    Psych:      alert and cooperative; normal mood and affect; normal attention span and concentration.      Result Review :               Pertinent cardiac workup    1. Stress test 5/21/2019.  Low risk ejection fraction 60%        ECG 12 Lead    Date/Time: 3/21/2023 10:09 AM  Performed by: Bassem Diallo MD  Authorized by: Bassem Diallo MD   Comparison: compared with previous ECG   Similar to previous ECG  Rhythm: atrial fibrillation  Rhythm comments: Atrial fibrillation with occasional ventricular pacing.  Rate: normal  BPM: 63  Conduction: conduction normal  ST Segments: ST segments normal  QRS axis: normal  Other findings: non-specific ST-T wave changes                Assessment and Plan      Jasmin  SOFY Mehta  is a 77-year-old female patient who has atrial fibrillation which is chronic at this time, dual-chamber pacemaker, placed in VVIR mode due to chronic A. fib, here for follow-up.  Patient is doing well denies any chest pain or shortness of breath, pacemaker interrogation showed appropriate function, no significant rapid ventricular rate on histogram.  For now we will continue same therapy.  She is adequately anticoagulated with Coumadin.  We will see her in follow-up in 6 months.    Diagnoses and all orders for this visit:    1. Chronic atrial fibrillation (HCC) (Primary)    2. Long term (current) use of anticoagulants [Z79.01]    3. Pacemaker    4. Peripheral edema           Return in about 6 months (around 9/21/2023).  Patient was given instructions and counseling regarding her condition or for health maintenance advice. Please see specific information pulled into the AVS if appropriate.

## 2023-04-18 ENCOUNTER — OFFICE VISIT (OUTPATIENT)
Dept: ORTHOPEDIC SURGERY | Facility: CLINIC | Age: 78
End: 2023-04-18
Payer: MEDICARE

## 2023-04-18 VITALS — HEART RATE: 75 BPM | OXYGEN SATURATION: 95 %

## 2023-04-18 DIAGNOSIS — M25.562 CHRONIC PAIN OF LEFT KNEE: ICD-10-CM

## 2023-04-18 DIAGNOSIS — E66.9 OBESITY (BMI 30-39.9): ICD-10-CM

## 2023-04-18 DIAGNOSIS — G89.29 CHRONIC PAIN OF LEFT KNEE: ICD-10-CM

## 2023-04-18 DIAGNOSIS — M17.12 PRIMARY OSTEOARTHRITIS OF LEFT KNEE: Primary | ICD-10-CM

## 2023-04-18 RX ORDER — TRIAMCINOLONE ACETONIDE 40 MG/ML
40 INJECTION, SUSPENSION INTRA-ARTICULAR; INTRAMUSCULAR
Status: COMPLETED | OUTPATIENT
Start: 2023-04-18 | End: 2023-04-18

## 2023-04-18 RX ORDER — LIDOCAINE HYDROCHLORIDE 10 MG/ML
4 INJECTION, SOLUTION EPIDURAL; INFILTRATION; INTRACAUDAL; PERINEURAL
Status: COMPLETED | OUTPATIENT
Start: 2023-04-18 | End: 2023-04-18

## 2023-04-18 RX ADMIN — TRIAMCINOLONE ACETONIDE 40 MG: 40 INJECTION, SUSPENSION INTRA-ARTICULAR; INTRAMUSCULAR at 09:46

## 2023-04-18 RX ADMIN — LIDOCAINE HYDROCHLORIDE 4 ML: 10 INJECTION, SOLUTION EPIDURAL; INFILTRATION; INTRACAUDAL; PERINEURAL at 09:46

## 2023-04-18 NOTE — PROGRESS NOTES
NEW/FOLLOW UP VISIT    Patient: Jasmin Mehta  ?  YOB: 1945    MRN: [id number]  ?  Chief Complaint   Patient presents with   • Left Knee - Initial Evaluation      ?  HPI: Patient is 77-year-old female who presents today for left knee pain.  Has a history of chronic osteoarthritis in both knees and seen in our office by Yvonne Haro in 2021.  States dull achy pain with painful popping primarily over the medial aspect of the left knee.  Stiffness when first initiating movement.  Worsening pain if she is on the knee for prolonged periods of time.  Denies any acute injury.  States over the last month she has had worsening pain.  States had a corticosteroid injection by an outside provider about 1 year ago was lasted 1 to 2 months.  She also has a history of viscosupplementation injection in her right knee in 2021 which gave her more substantial relief for over 6 months.  She is also tried over-the-counter ibuprofen, Mobic, ice and heat with minimal relief.  No recent physical therapy.      Allergies:   Allergies   Allergen Reactions   • Lactose Intolerance (Gi) GI Intolerance     Severe stomach pain   • Lactulose GI Intolerance     Severe stomach pain       Past Medical History:   Diagnosis Date   • Arm fracture, right 05/2020    Pt seen Dr. Alexander   • Arrhythmia    • Atrial fibrillation    • CHF (congestive heart failure)    • Chronic coronary artery disease    • DVT (deep venous thrombosis)     and venous filter   • Heart valve disease    • Hypertension    • Primary osteoarthritis of right knee 9/13/2021   • Stroke      Past Surgical History:   Procedure Laterality Date   • BARIATRIC SURGERY  2005   • CARDIAC CATHETERIZATION  02/01/2012   • CARDIAC ELECTROPHYSIOLOGY PROCEDURE N/A 7/31/2020    Procedure: PPM generator change - dual;  Surgeon: Valente Duong MD;  Location: [facility] INVASIVE LOCATION;  Service: Cardiovascular;  Laterality: N/A;   • CARDIAC ELECTROPHYSIOLOGY PROCEDURE N/A 7/31/2020     Procedure: Pocket Revision;  Surgeon: Valente Duong MD;  Location: [facility] CATH INVASIVE LOCATION;  Service: Cardiovascular;  Laterality: N/A;   • CARDIOVERSION      CARDIOVERSION X 3   • CHOLECYSTECTOMY     • GASTRIC BYPASS  2005   • HERNIA REPAIR     • INSERT / REPLACE / REMOVE PACEMAKER     • OTHER SURGICAL HISTORY      RF ablation failure because of lack of RA access   • PACEMAKER IMPLANTATION  08/15/2012    permanent dual chamber - medtronic MRI compatible   • TONSILLECTOMY       Social History     Occupational History   • Not on file   Tobacco Use   • Smoking status: Never   • Smokeless tobacco: Never   Vaping Use   • Vaping Use: Never used   Substance and Sexual Activity   • Alcohol use: Never   • Drug use: Never   • Sexual activity: Not Currently     Partners: Male     Birth control/protection: Post-menopausal, None      Social History     Social History Narrative   • Not on file     Family History   Problem Relation Age of Onset   • Stroke Father    • Stroke Other    • Heart disease Other         FH - MI   • Breast cancer Mother    • Heart attack Mother        Review of Systems  Constitutional: Negative.  Negative for fever.   Musculoskeletal: Positive for joint pain  Skin: Negative.  Negative for rash and wound.    Neurological: Negative for numbness.     Vitals:    04/18/23 0909   Pulse: 75   SpO2: 95%        Physical Exam  Constitutional: Patient is oriented to person, place, and time. Appears well-developed and well-nourished.   Head: Normocephalic and atraumatic.   Pulmonary/Chest: Effort normal.   Musculoskeletal:   See detailed exam below   Neurological: Alert and oriented to person, place, and time. No sensory deficit. Coordination normal.   Skin: Skin is warm and dry. Capillary refill takes less than 2 seconds. No rash noted. No erythema.     The left knee is without obvious signs of acute bony deformity, quadriceps atrophy, swelling, erythema, or ecchymosis. Mild joint effusion. The patella is  without tenderness. Apprehension is negative with medial and lateral glide. Patella crepitus is positive. Patella grind is positive. The medial joint line is tender to palpation. The lateral joint line is nontender to palpation. Soft tissue including the distal hamstring tendons, pes anserine, quad tendon, patellar tendon, proximal gastroc tendon, distal IT band, and Gerdy's tubercle are nontender. Flexion & extension are limited at end range motion and painful. Knee strength is 4/5 secondary to pain. Varus & valgus stress, anterior drawer, Gustavo's, and posterior drawer are all negative. The opposite knee is nontender and stable. Gait is painful and tandem.        Diagnostics:  xrays obtained today     Left Knee X-Ray  Indication: Pain    Views: AP, Lateral, and Wilburn    Findings:  No fracture  No bony lesion  Normal soft tissues  Joint space narrowing with subchondral sclerosis and mild osteophyte formation most pronounced in medial and patellofemoral compartment consistent with moderate to early severe osteoarthritic change        Assessment:  Diagnoses and all orders for this visit:    1. Primary osteoarthritis of left knee (Primary)  -     Large Joint Arthrocentesis    2. Chronic pain of left knee  -     XR Knee 3 View Left  -     Large Joint Arthrocentesis    3. Obesity (BMI 30-39.9)        Plan    · Corticosteroid and viscosupplementation injection discussed   · Physical Therapy discussed which patient declined today and preferring home exercise  · Activity modifications discussed and recommended  · Weight loss recommended  · Rest, ice, compression, and elevation (RICE) therapy  · As needed over-the-counter Tylenol arthritis or NSAIDs as tolerated  · Follow up in 4-6 month(s) or sooner as needed     Patient with acute flare of osteoarthritic knee pain that has not responded to over-the-counter oral medications and activity modification.  We discussed different options and elected proceed with a  corticosteroid injection as noted above.  Given she has minimal duration of relief with corticosteroid injections in the past, we will also work on approval for viscosupplementation injection as she has had more longstanding relief with this injection on her contralateral knee.  Continue other conservative measures as noted above.  We will follow-up as needed for potential viscosupplementation injection after approval.    Date of encounter: 04/18/2023   Kayode Watts DO    Electronically signed by Kayode Watts DO, 04/18/23, 9:20 AM EDT.    Disclaimer: Please note that areas of this note were completed with computer voice recognition software.  Quite often unanticipated grammatical, syntax, homophones, and other interpretive errors are inadvertently transcribed by the computer software. Please excuse any errors that have escaped final proofreading.

## 2023-04-18 NOTE — PROGRESS NOTES
Procedure   Large Joint Arthrocentesis  Date/Time: 4/18/2023 9:46 AM  Consent given by: patient  Site marked: site marked  Timeout: Immediately prior to procedure a time out was called to verify the correct patient, procedure, equipment, support staff and site/side marked as required   Supporting Documentation  Indications: pain and joint swelling   Procedure Details  Location: knee -   Needle size: 25 G  Approach: anteromedial  Medications administered: 40 mg triamcinolone acetonide 40 MG/ML; 4 mL lidocaine PF 1% 1 %  Patient tolerance: patient tolerated the procedure well with no immediate complications

## 2023-04-25 ENCOUNTER — ANTICOAGULATION VISIT (OUTPATIENT)
Dept: CARDIOLOGY | Facility: CLINIC | Age: 78
End: 2023-04-25
Payer: MEDICARE

## 2023-04-25 VITALS — DIASTOLIC BLOOD PRESSURE: 70 MMHG | SYSTOLIC BLOOD PRESSURE: 141 MMHG | HEART RATE: 68 BPM

## 2023-04-25 DIAGNOSIS — Z79.01 LONG TERM (CURRENT) USE OF ANTICOAGULANTS: ICD-10-CM

## 2023-04-25 DIAGNOSIS — I48.20 CHRONIC ATRIAL FIBRILLATION: Primary | ICD-10-CM

## 2023-04-25 LAB — INR PPP: 2.3 (ref 2–3)

## 2023-05-01 DIAGNOSIS — I48.0 PAROXYSMAL ATRIAL FIBRILLATION: ICD-10-CM

## 2023-05-01 DIAGNOSIS — Z79.01 LONG TERM (CURRENT) USE OF ANTICOAGULANTS: ICD-10-CM

## 2023-05-02 RX ORDER — WARFARIN SODIUM 4 MG/1
TABLET ORAL
Qty: 150 TABLET | Refills: 0 | Status: SHIPPED | OUTPATIENT
Start: 2023-05-02

## 2023-05-02 NOTE — TELEPHONE ENCOUNTER
Rx Refill Note  Requested Prescriptions     Pending Prescriptions Disp Refills   • warfarin (Jantoven) 4 MG tablet [Pharmacy Med Name: JANTOVEN TAB 4MG] 150 tablet 0     Sig: TAKE 2 TABLETS ON SUNDAY,  TUESDAY, AND THURSDAY; TAKE1 AND 1/2 TABLETS ALL OTHERDAYS, OR AS DIRECTED      Last office visit with prescribing clinician: 3/21/2023   Last telemedicine visit with prescribing clinician: 6/6/2023   Next office visit with prescribing clinician: 10/3/2023                         Would you like a call back once the refill request has been completed: [] Yes [] No    If the office needs to give you a call back, can they leave a voicemail: [] Yes [] No    Lisa Medellin MA  05/02/23, 07:29 EDT

## 2023-06-06 ENCOUNTER — ANTICOAGULATION VISIT (OUTPATIENT)
Dept: CARDIOLOGY | Facility: CLINIC | Age: 78
End: 2023-06-06
Payer: MEDICARE

## 2023-06-06 VITALS
HEART RATE: 65 BPM | DIASTOLIC BLOOD PRESSURE: 61 MMHG | SYSTOLIC BLOOD PRESSURE: 118 MMHG | BODY MASS INDEX: 35.99 KG/M2 | WEIGHT: 223 LBS

## 2023-06-06 DIAGNOSIS — Z79.01 LONG TERM (CURRENT) USE OF ANTICOAGULANTS: ICD-10-CM

## 2023-06-06 DIAGNOSIS — I48.20 CHRONIC ATRIAL FIBRILLATION: Primary | ICD-10-CM

## 2023-06-06 LAB — INR PPP: 1.6 (ref 2–3)

## 2023-06-06 PROCEDURE — 85610 PROTHROMBIN TIME: CPT | Performed by: INTERNAL MEDICINE

## 2023-06-06 PROCEDURE — 36416 COLLJ CAPILLARY BLOOD SPEC: CPT | Performed by: INTERNAL MEDICINE

## 2023-07-18 ENCOUNTER — TELEPHONE (OUTPATIENT)
Dept: ORTHOPEDIC SURGERY | Facility: CLINIC | Age: 78
End: 2023-07-18

## 2023-07-18 NOTE — TELEPHONE ENCOUNTER
PLEASE CALL / LEAVE VMAIL TO DISCUSS SCHEDULING:     FOLLOW UP / LEFT KNEE / NO NEW INJURY SINCE 04-18-23 APPT & LEFT KNEE CORTISONE INJECTION (WANTS ADDITIONAL)     UNAVAILABLE THURSDAYS - WANTS A TUESDAY IN [place]     THANKS

## 2023-07-25 ENCOUNTER — OFFICE VISIT (OUTPATIENT)
Dept: ORTHOPEDIC SURGERY | Facility: CLINIC | Age: 78
End: 2023-07-25
Payer: MEDICARE

## 2023-07-25 VITALS — HEART RATE: 63 BPM | HEIGHT: 66 IN | OXYGEN SATURATION: 96 % | WEIGHT: 223 LBS | BODY MASS INDEX: 35.84 KG/M2

## 2023-07-25 DIAGNOSIS — E66.9 OBESITY (BMI 30-39.9): ICD-10-CM

## 2023-07-25 DIAGNOSIS — G89.29 CHRONIC PAIN OF LEFT KNEE: ICD-10-CM

## 2023-07-25 DIAGNOSIS — M17.12 PRIMARY OSTEOARTHRITIS OF LEFT KNEE: Primary | ICD-10-CM

## 2023-07-25 DIAGNOSIS — M25.562 CHRONIC PAIN OF LEFT KNEE: ICD-10-CM

## 2023-07-25 RX ORDER — TRIAMCINOLONE ACETONIDE 40 MG/ML
40 INJECTION, SUSPENSION INTRA-ARTICULAR; INTRAMUSCULAR
Status: COMPLETED | OUTPATIENT
Start: 2023-07-25 | End: 2023-07-25

## 2023-07-25 RX ORDER — LIDOCAINE HYDROCHLORIDE 10 MG/ML
4 INJECTION, SOLUTION EPIDURAL; INFILTRATION; INTRACAUDAL; PERINEURAL
Status: COMPLETED | OUTPATIENT
Start: 2023-07-25 | End: 2023-07-25

## 2023-07-25 RX ADMIN — LIDOCAINE HYDROCHLORIDE 4 ML: 10 INJECTION, SOLUTION EPIDURAL; INFILTRATION; INTRACAUDAL; PERINEURAL at 13:46

## 2023-07-25 RX ADMIN — TRIAMCINOLONE ACETONIDE 40 MG: 40 INJECTION, SUSPENSION INTRA-ARTICULAR; INTRAMUSCULAR at 13:46

## 2023-07-25 NOTE — PROGRESS NOTES
FOLLOW UP VISIT    Patient: Jasmin Mehta  ?  YOB: 1945    MRN: [id number]  ?  Chief Complaint   Patient presents with    Left Knee - Follow-up      ?  HPI: Patient returns today for follow-up of left knee pain and osteoarthritis.  She most recently had a corticosteroid injection on 4/18/2023 which she states gave her just over 3 months of significant relief.  States the pain has gradually returned over the last few weeks.  Continues to have pain primarily over the medial aspect of the knee with intermittent popping and swelling.  Denies any new injuries.    Allergies:   Allergies   Allergen Reactions    Lactose Intolerance (Gi) GI Intolerance     Severe stomach pain    Lactulose GI Intolerance     Severe stomach pain       Past Medical History:   Diagnosis Date    Arm fracture, right 05/2020    Pt seen Dr. Alexander    Arrhythmia     Atrial fibrillation     CHF (congestive heart failure)     Chronic coronary artery disease     DVT (deep venous thrombosis)     and venous filter    Fracture of wrist 2020    Fracture, radius     Heart valve disease     Hypertension     Primary osteoarthritis of right knee 09/13/2021    Stroke      Past Surgical History:   Procedure Laterality Date    BARIATRIC SURGERY  2005    CARDIAC CATHETERIZATION  02/01/2012    CARDIAC ELECTROPHYSIOLOGY PROCEDURE N/A 07/31/2020    Procedure: PPM generator change - dual;  Surgeon: Valente Duong MD;  Location: [facility] CATH INVASIVE LOCATION;  Service: Cardiovascular;  Laterality: N/A;    CARDIAC ELECTROPHYSIOLOGY PROCEDURE N/A 07/31/2020    Procedure: Pocket Revision;  Surgeon: Valente Duong MD;  Location: [facility] CATH INVASIVE LOCATION;  Service: Cardiovascular;  Laterality: N/A;    CARDIOVERSION      CARDIOVERSION X 3    CHOLECYSTECTOMY      GASTRIC BYPASS  2005    HERNIA REPAIR      INSERT / REPLACE / REMOVE PACEMAKER      OTHER SURGICAL HISTORY      RF ablation failure because of lack of RA access    PACEMAKER IMPLANTATION   "08/15/2012    permanent dual chamber - medtronic MRI compatible    TONSILLECTOMY      TRIGGER POINT INJECTION  4/2023     Social History     Occupational History    Not on file   Tobacco Use    Smoking status: Never    Smokeless tobacco: Never   Vaping Use    Vaping Use: Never used   Substance and Sexual Activity    Alcohol use: Never    Drug use: Never    Sexual activity: Not Currently     Partners: Male     Birth control/protection: Post-menopausal, None      Social History     Social History Narrative    Not on file     Family History   Problem Relation Age of Onset    Stroke Father     Stroke Other     Heart disease Other         FH - MI    Breast cancer Mother     Heart attack Mother     Cancer Mother        Review of Systems  Constitutional: Negative.  Negative for fever.   Musculoskeletal: Positive for joint pain  Skin: Negative.  Negative for rash and wound.    Neurological: Negative for numbness.     Vitals:    07/25/23 1327   Pulse: 63   SpO2: 96%   Weight: 101 kg (223 lb)   Height: 167.6 cm (66\")        Physical Exam  Constitutional: Patient is oriented to person, place, and time. Appears well-developed and well-nourished.   Head: Normocephalic and atraumatic.   Pulmonary/Chest: Effort normal.   Musculoskeletal:   See detailed exam below   Neurological: Alert and oriented to person, place, and time. No sensory deficit. Coordination normal.   Skin: Skin is warm and dry. Capillary refill takes less than 2 seconds. No rash noted. No erythema.     The left knee is without obvious signs of acute bony deformity, quadriceps atrophy, swelling, erythema, or ecchymosis. Mild joint effusion. The patella is without tenderness. Apprehension is negative with medial and lateral glide. Patella crepitus is positive. Patella grind is positive. The medial joint line is tender to palpation. The lateral joint line is nontender to palpation. Soft tissue including the distal hamstring tendons, pes anserine, quad tendon, " patellar tendon, proximal gastroc tendon, distal IT band, and Gerdy's tubercle are nontender. Flexion & extension are limited at end range motion and painful. Knee strength is 4/5 secondary to pain. Varus & valgus stress, anterior drawer, Gustavo's, and posterior drawer are all negative. The opposite knee is nontender and stable. Gait is painful and tandem.    Exam reviewed and no interval change since last office visit.    Diagnostics:  no diagnostic testing performed this visit     Left Knee X-Ray  Indication: Pain    Views: AP, Lateral, and Bushyhead    Findings:  No fracture  No bony lesion  Normal soft tissues  Joint space narrowing with subchondral sclerosis and mild osteophyte formation most pronounced in medial and patellofemoral compartment consistent with moderate to early severe osteoarthritic change        Assessment:  Diagnoses and all orders for this visit:    1. Primary osteoarthritis of left knee (Primary)  -     Large Joint Arthrocentesis: L knee    2. Chronic pain of left knee  -     Large Joint Arthrocentesis: L knee    3. Obesity (BMI 30-39.9)        Plan    Repeat corticosteroid injection given as noted above without complication.  Patient had significant relief for just over 3 months with prior injection.  Again discussed viscosupplementation injection  Continue home exercises  Activity modifications discussed and recommended  Weight loss recommended  Rest, ice, compression, and elevation (RICE) therapy  As needed over-the-counter Tylenol arthritis or NSAIDs as tolerated  Follow up in 4-6 month(s) or sooner as needed     Date of encounter: 07/25/2023  Kayode Watts DO      Disclaimer: Please note that areas of this note were completed with computer voice recognition software.  Quite often unanticipated grammatical, syntax, homophones, and other interpretive errors are inadvertently transcribed by the computer software. Please excuse any errors that have escaped final proofreading.

## 2023-07-25 NOTE — PROGRESS NOTES
Procedure   Large Joint Arthrocentesis: L knee  Date/Time: 7/25/2023 1:46 PM  Consent given by: patient  Site marked: site marked  Timeout: Immediately prior to procedure a time out was called to verify the correct patient, procedure, equipment, support staff and site/side marked as required   Supporting Documentation  Indications: pain   Procedure Details  Location: knee - L knee  Preparation: Patient was prepped and draped in the usual sterile fashion  Needle size: 25 G  Approach: anteromedial  Medications administered: 4 mL lidocaine PF 1% 1 %; 40 mg triamcinolone acetonide 40 MG/ML  Patient tolerance: patient tolerated the procedure well with no immediate complications

## 2023-08-01 ENCOUNTER — ANTICOAGULATION VISIT (OUTPATIENT)
Dept: CARDIOLOGY | Facility: CLINIC | Age: 78
End: 2023-08-01
Payer: MEDICARE

## 2023-08-01 ENCOUNTER — OFFICE VISIT (OUTPATIENT)
Dept: CARDIOLOGY | Facility: CLINIC | Age: 78
End: 2023-08-01
Payer: MEDICARE

## 2023-08-01 VITALS
DIASTOLIC BLOOD PRESSURE: 66 MMHG | WEIGHT: 219 LBS | BODY MASS INDEX: 35.2 KG/M2 | HEIGHT: 66 IN | SYSTOLIC BLOOD PRESSURE: 119 MMHG | HEART RATE: 69 BPM

## 2023-08-01 VITALS — HEART RATE: 94 BPM | DIASTOLIC BLOOD PRESSURE: 74 MMHG | SYSTOLIC BLOOD PRESSURE: 197 MMHG

## 2023-08-01 DIAGNOSIS — I20.9 ANGINA PECTORIS: Primary | ICD-10-CM

## 2023-08-01 DIAGNOSIS — I48.20 CHRONIC ATRIAL FIBRILLATION: ICD-10-CM

## 2023-08-01 DIAGNOSIS — Z79.01 LONG TERM (CURRENT) USE OF ANTICOAGULANTS: ICD-10-CM

## 2023-08-01 DIAGNOSIS — I48.20 CHRONIC ATRIAL FIBRILLATION: Primary | ICD-10-CM

## 2023-08-01 LAB — INR PPP: 2.5 (ref 2–3)

## 2023-08-01 PROCEDURE — 93000 ELECTROCARDIOGRAM COMPLETE: CPT | Performed by: INTERNAL MEDICINE

## 2023-08-01 PROCEDURE — 36416 COLLJ CAPILLARY BLOOD SPEC: CPT | Performed by: INTERNAL MEDICINE

## 2023-08-01 PROCEDURE — 1160F RVW MEDS BY RX/DR IN RCRD: CPT | Performed by: INTERNAL MEDICINE

## 2023-08-01 PROCEDURE — 99213 OFFICE O/P EST LOW 20 MIN: CPT | Performed by: INTERNAL MEDICINE

## 2023-08-01 PROCEDURE — 3074F SYST BP LT 130 MM HG: CPT | Performed by: INTERNAL MEDICINE

## 2023-08-01 PROCEDURE — 1159F MED LIST DOCD IN RCRD: CPT | Performed by: INTERNAL MEDICINE

## 2023-08-01 PROCEDURE — 85610 PROTHROMBIN TIME: CPT | Performed by: INTERNAL MEDICINE

## 2023-08-01 PROCEDURE — 3078F DIAST BP <80 MM HG: CPT | Performed by: INTERNAL MEDICINE

## 2023-08-06 DIAGNOSIS — I48.0 PAROXYSMAL ATRIAL FIBRILLATION: ICD-10-CM

## 2023-08-06 DIAGNOSIS — Z79.01 LONG TERM (CURRENT) USE OF ANTICOAGULANTS: ICD-10-CM

## 2023-08-07 RX ORDER — WARFARIN SODIUM 4 MG/1
TABLET ORAL
Qty: 150 TABLET | Refills: 0 | Status: SHIPPED | OUTPATIENT
Start: 2023-08-07

## 2023-08-07 NOTE — TELEPHONE ENCOUNTER
Rx Refill Note  Requested Prescriptions     Pending Prescriptions Disp Refills    warfarin (Jantoven) 4 MG tablet [Pharmacy Med Name: JANTOVEN TAB 4MG] 150 tablet 0     Sig: TAKE 2 TABLETS ON SUNDAY,  TUESDAY, AND THURSDAY; TAKE1 AND 1/2 TABLETS ALL OTHERDAYS, OR AS DIRECTED    Last INR 8/1/23  Last office visit with prescribing clinician: 8/1/2023   Last telemedicine visit with prescribing clinician: Visit date not found   Next office visit with prescribing clinician: 10/3/2023                         Would you like a call back once the refill request has been completed: [] Yes [] No    If the office needs to give you a call back, can they leave a voicemail: [] Yes [] No    Shahla Abraham RN  08/07/23, 11:44 EDT

## 2023-08-21 ENCOUNTER — HOSPITAL ENCOUNTER (OUTPATIENT)
Dept: CARDIOLOGY | Facility: HOSPITAL | Age: 78
Discharge: HOME | End: 2023-08-21
Payer: MEDICARE

## 2023-08-21 DIAGNOSIS — I20.9 ANGINA PECTORIS: ICD-10-CM

## 2023-08-21 PROCEDURE — 93017 CV STRESS TEST TRACING ONLY: CPT

## 2023-08-21 PROCEDURE — 78452 HT MUSCLE IMAGE SPECT MULT: CPT

## 2023-08-21 PROCEDURE — A9500 TC99M SESTAMIBI: HCPCS | Performed by: INTERNAL MEDICINE

## 2023-08-21 PROCEDURE — 0 TECHNETIUM SESTAMIBI: Performed by: INTERNAL MEDICINE

## 2023-08-21 PROCEDURE — 25010000002 REGADENOSON 0.4 MG/5ML SOLUTION: Performed by: INTERNAL MEDICINE

## 2023-08-21 RX ORDER — REGADENOSON 0.08 MG/ML
0.4 INJECTION, SOLUTION INTRAVENOUS
Status: COMPLETED | OUTPATIENT
Start: 2023-08-21 | End: 2023-08-21

## 2023-08-21 RX ADMIN — REGADENOSON 0.4 MG: 0.08 INJECTION, SOLUTION INTRAVENOUS at 13:52

## 2023-08-21 RX ADMIN — TECHNETIUM TC 99M SESTAMIBI 1 DOSE: 1 INJECTION INTRAVENOUS at 12:05

## 2023-08-21 RX ADMIN — TECHNETIUM TC 99M SESTAMIBI 1 DOSE: 1 INJECTION INTRAVENOUS at 13:53

## 2023-08-30 LAB
BH CV REST NUCLEAR ISOTOPE DOSE: 10.7 MCI
BH CV STRESS BP STAGE 1: NORMAL
BH CV STRESS COMMENTS STAGE 1: NORMAL
BH CV STRESS DOSE REGADENOSON STAGE 1: 0.4
BH CV STRESS DURATION MIN STAGE 1: 0
BH CV STRESS DURATION SEC STAGE 1: 10
BH CV STRESS HR STAGE 1: 64
BH CV STRESS NUCLEAR ISOTOPE DOSE: 32.1 MCI
BH CV STRESS PROTOCOL 1: NORMAL
BH CV STRESS RECOVERY BP: NORMAL MMHG
BH CV STRESS RECOVERY HR: 67 BPM
BH CV STRESS STAGE 1: 1
LV EF NUC BP: 71 %
MAXIMAL PREDICTED HEART RATE: 143 BPM
STRESS BASELINE BP: NORMAL MMHG
STRESS BASELINE HR: 64 BPM
STRESS TARGET HR: 122 BPM

## 2023-08-31 ENCOUNTER — TELEPHONE (OUTPATIENT)
Dept: CARDIOLOGY | Facility: CLINIC | Age: 78
End: 2023-08-31
Payer: MEDICARE

## 2023-09-05 ENCOUNTER — ANTICOAGULATION VISIT (OUTPATIENT)
Dept: CARDIOLOGY | Facility: CLINIC | Age: 78
End: 2023-09-05
Payer: MEDICARE

## 2023-09-05 VITALS
BODY MASS INDEX: 35.35 KG/M2 | DIASTOLIC BLOOD PRESSURE: 95 MMHG | WEIGHT: 219 LBS | HEART RATE: 94 BPM | SYSTOLIC BLOOD PRESSURE: 111 MMHG

## 2023-09-05 DIAGNOSIS — Z79.01 LONG TERM (CURRENT) USE OF ANTICOAGULANTS: ICD-10-CM

## 2023-09-05 DIAGNOSIS — I48.20 CHRONIC ATRIAL FIBRILLATION: Primary | ICD-10-CM

## 2023-09-05 LAB — INR PPP: 2.8 (ref 2–3)

## 2023-09-05 PROCEDURE — 85610 PROTHROMBIN TIME: CPT | Performed by: INTERNAL MEDICINE

## 2023-09-05 PROCEDURE — 36416 COLLJ CAPILLARY BLOOD SPEC: CPT | Performed by: INTERNAL MEDICINE

## 2023-09-11 PROBLEM — R94.39 ABNORMAL NUCLEAR STRESS TEST: Status: ACTIVE | Noted: 2023-09-11

## 2023-09-11 NOTE — TELEPHONE ENCOUNTER
Spoke with Dr. Diallo, next step is heart cath with Dr. Smith, who is aware. Phone , Carlo, and informed him that the office will call with date/time of cath.

## 2023-09-11 NOTE — TELEPHONE ENCOUNTER
Pt's spouse-Carlo, states that they did receive a phone call about a stress test and was told that it was fine. He states later he received a phone call form Dr. Diallo, which led them to believe there is something wrong.     Carlo also states that he left a VM on the MA line and there call was not returned.

## 2023-09-12 ENCOUNTER — TELEPHONE (OUTPATIENT)
Dept: CARDIOLOGY | Facility: CLINIC | Age: 78
End: 2023-09-12
Payer: MEDICARE

## 2023-09-12 NOTE — TELEPHONE ENCOUNTER
PATIENT HAS HISTORY OF DEEP VEIN THROMBOSIS AND HAS MESH IN LOWER ABDOMEN. PATIENT SCHEDULED FOR HEART CATH 9/19 AND WANT TO MAKE SURE DR. MARTINEZ IS AWARE OF THIS.

## 2023-09-18 ENCOUNTER — LAB (OUTPATIENT)
Dept: LAB | Facility: HOSPITAL | Age: 78
End: 2023-09-18
Payer: MEDICARE

## 2023-09-18 DIAGNOSIS — I25.10 ATHEROSCLEROSIS OF NATIVE CORONARY ARTERY OF NATIVE HEART, UNSPECIFIED WHETHER ANGINA PRESENT: Primary | ICD-10-CM

## 2023-09-18 DIAGNOSIS — I25.10 CORONARY ARTERY DISEASE INVOLVING NATIVE CORONARY ARTERY OF NATIVE HEART, UNSPECIFIED WHETHER ANGINA PRESENT: ICD-10-CM

## 2023-09-18 DIAGNOSIS — R94.39 ABNORMAL NUCLEAR STRESS TEST: ICD-10-CM

## 2023-09-18 DIAGNOSIS — R94.39 ABNORMAL NUCLEAR STRESS TEST: Primary | ICD-10-CM

## 2023-09-18 LAB
ANION GAP SERPL CALCULATED.3IONS-SCNC: 13 MMOL/L (ref 5–15)
BASOPHILS # BLD AUTO: 0 10*3/MM3 (ref 0–0.2)
BASOPHILS NFR BLD AUTO: 0.6 % (ref 0–1.5)
BUN SERPL-MCNC: 23 MG/DL (ref 8–23)
BUN/CREAT SERPL: 21.3 (ref 7–25)
CALCIUM SPEC-SCNC: 9.9 MG/DL (ref 8.6–10.5)
CHLORIDE SERPL-SCNC: 103 MMOL/L (ref 98–107)
CO2 SERPL-SCNC: 24 MMOL/L (ref 22–29)
CREAT SERPL-MCNC: 1.08 MG/DL (ref 0.57–1)
DEPRECATED RDW RBC AUTO: 45.1 FL (ref 37–54)
EGFRCR SERPLBLD CKD-EPI 2021: 53 ML/MIN/1.73
EOSINOPHIL # BLD AUTO: 0.1 10*3/MM3 (ref 0–0.4)
EOSINOPHIL NFR BLD AUTO: 2.8 % (ref 0.3–6.2)
ERYTHROCYTE [DISTWIDTH] IN BLOOD BY AUTOMATED COUNT: 14.1 % (ref 12.3–15.4)
GLUCOSE SERPL-MCNC: 96 MG/DL (ref 65–99)
HCT VFR BLD AUTO: 44.8 % (ref 34–46.6)
HGB BLD-MCNC: 14.7 G/DL (ref 12–15.9)
INR PPP: 1.64 (ref 2–3)
LYMPHOCYTES # BLD AUTO: 1.5 10*3/MM3 (ref 0.7–3.1)
LYMPHOCYTES NFR BLD AUTO: 29.1 % (ref 19.6–45.3)
MAGNESIUM SERPL-MCNC: 3.6 MG/DL (ref 1.6–2.4)
MCH RBC QN AUTO: 30.5 PG (ref 26.6–33)
MCHC RBC AUTO-ENTMCNC: 32.9 G/DL (ref 31.5–35.7)
MCV RBC AUTO: 92.7 FL (ref 79–97)
MONOCYTES # BLD AUTO: 0.6 10*3/MM3 (ref 0.1–0.9)
MONOCYTES NFR BLD AUTO: 11.1 % (ref 5–12)
NEUTROPHILS NFR BLD AUTO: 2.9 10*3/MM3 (ref 1.7–7)
NEUTROPHILS NFR BLD AUTO: 56.4 % (ref 42.7–76)
NRBC BLD AUTO-RTO: 0.1 /100 WBC (ref 0–0.2)
PLATELET # BLD AUTO: 172 10*3/MM3 (ref 140–450)
PMV BLD AUTO: 7.6 FL (ref 6–12)
POTASSIUM SERPL-SCNC: 4.1 MMOL/L (ref 3.5–5.2)
PROTHROMBIN TIME: 17.1 SECONDS (ref 19.4–28.5)
RBC # BLD AUTO: 4.83 10*6/MM3 (ref 3.77–5.28)
SODIUM SERPL-SCNC: 140 MMOL/L (ref 136–145)
WBC NRBC COR # BLD: 5.1 10*3/MM3 (ref 3.4–10.8)

## 2023-09-18 PROCEDURE — 85025 COMPLETE CBC W/AUTO DIFF WBC: CPT

## 2023-09-18 PROCEDURE — 36415 COLL VENOUS BLD VENIPUNCTURE: CPT

## 2023-09-18 PROCEDURE — 83735 ASSAY OF MAGNESIUM: CPT

## 2023-09-18 PROCEDURE — 85610 PROTHROMBIN TIME: CPT

## 2023-09-18 PROCEDURE — 80048 BASIC METABOLIC PNL TOTAL CA: CPT

## 2023-09-19 ENCOUNTER — HOSPITAL ENCOUNTER (OUTPATIENT)
Facility: HOSPITAL | Age: 78
Setting detail: HOSPITAL OUTPATIENT SURGERY
Discharge: HOME | End: 2023-09-19
Attending: INTERNAL MEDICINE | Admitting: INTERNAL MEDICINE

## 2023-09-19 VITALS
TEMPERATURE: 98.4 F | OXYGEN SATURATION: 100 % | RESPIRATION RATE: 20 BRPM | BODY MASS INDEX: 37.75 KG/M2 | HEIGHT: 64 IN | WEIGHT: 221.12 LBS | DIASTOLIC BLOOD PRESSURE: 56 MMHG | HEART RATE: 67 BPM | SYSTOLIC BLOOD PRESSURE: 118 MMHG

## 2023-09-19 DIAGNOSIS — I20.89 ANGINA AT REST: ICD-10-CM

## 2023-09-19 DIAGNOSIS — I20.8 ANGINA AT REST: ICD-10-CM

## 2023-09-19 DIAGNOSIS — R94.39 ABNORMAL NUCLEAR STRESS TEST: ICD-10-CM

## 2023-09-19 PROCEDURE — 25010000002 FENTANYL CITRATE (PF) 100 MCG/2ML SOLUTION: Performed by: INTERNAL MEDICINE

## 2023-09-19 PROCEDURE — 93458 L HRT ARTERY/VENTRICLE ANGIO: CPT | Performed by: INTERNAL MEDICINE

## 2023-09-19 PROCEDURE — 25010000002 MIDAZOLAM PER 1 MG: Performed by: INTERNAL MEDICINE

## 2023-09-19 PROCEDURE — C1894 INTRO/SHEATH, NON-LASER: HCPCS | Performed by: INTERNAL MEDICINE

## 2023-09-19 PROCEDURE — C1769 GUIDE WIRE: HCPCS | Performed by: INTERNAL MEDICINE

## 2023-09-19 PROCEDURE — 99152 MOD SED SAME PHYS/QHP 5/>YRS: CPT | Performed by: INTERNAL MEDICINE

## 2023-09-19 PROCEDURE — 25510000001 IOPAMIDOL PER 1 ML: Performed by: INTERNAL MEDICINE

## 2023-09-19 RX ORDER — SODIUM CHLORIDE 9 MG/ML
250 INJECTION, SOLUTION INTRAVENOUS ONCE AS NEEDED
Status: DISCONTINUED | OUTPATIENT
Start: 2023-09-19 | End: 2023-09-19 | Stop reason: HOSPADM

## 2023-09-19 RX ORDER — SODIUM CHLORIDE 9 MG/ML
75 INJECTION, SOLUTION INTRAVENOUS CONTINUOUS
Status: DISCONTINUED | OUTPATIENT
Start: 2023-09-19 | End: 2023-09-19 | Stop reason: HOSPADM

## 2023-09-19 RX ORDER — ACETAMINOPHEN 325 MG/1
650 TABLET ORAL EVERY 4 HOURS PRN
Status: DISCONTINUED | OUTPATIENT
Start: 2023-09-19 | End: 2023-09-19 | Stop reason: HOSPADM

## 2023-09-19 RX ORDER — FENTANYL CITRATE 50 UG/ML
INJECTION, SOLUTION INTRAMUSCULAR; INTRAVENOUS
Status: DISCONTINUED | OUTPATIENT
Start: 2023-09-19 | End: 2023-09-19 | Stop reason: HOSPADM

## 2023-09-19 RX ORDER — MIDAZOLAM HYDROCHLORIDE 1 MG/ML
INJECTION INTRAMUSCULAR; INTRAVENOUS
Status: DISCONTINUED | OUTPATIENT
Start: 2023-09-19 | End: 2023-09-19 | Stop reason: HOSPADM

## 2023-09-19 RX ORDER — NITROGLYCERIN 0.4 MG/1
0.4 TABLET SUBLINGUAL
Status: DISCONTINUED | OUTPATIENT
Start: 2023-09-19 | End: 2023-09-19 | Stop reason: HOSPADM

## 2023-09-19 RX ORDER — LIDOCAINE HYDROCHLORIDE 20 MG/ML
INJECTION, SOLUTION INFILTRATION; PERINEURAL
Status: DISCONTINUED | OUTPATIENT
Start: 2023-09-19 | End: 2023-09-19 | Stop reason: HOSPADM

## 2023-09-19 NOTE — H&P
Patient Care Team:  Warren August MD as PCP - General  Warren August MD as PCP - Family Medicine  Bassem Diallo MD as Consulting Physician (Cardiology)    Chief complaint chest pain    Subjective     History of Present Illness 77-year-old with chest pain typical of angina    Review of Systems   Cardiovascular:  Positive for chest pain.        Past Medical History:   Diagnosis Date    Arm fracture, right 05/2020    Pt seen Dr. Alexander    Arrhythmia     Atrial fibrillation     CHF (congestive heart failure)     Chronic coronary artery disease     DVT (deep venous thrombosis)     and venous filter    Fracture of wrist 2020    Fracture, radius     Heart valve disease     Hypertension     Primary osteoarthritis of right knee 09/13/2021    Stroke        Objective      Vital Signs  Temp:  [98.4 °F (36.9 °C)] 98.4 °F (36.9 °C)  Heart Rate:  [63-84] 63  Resp:  [14-20] 20  BP: (113-147)/(56-74) 113/56    Physical Exam  HENT:      Head: Normocephalic and atraumatic.   Eyes:      Pupils: Pupils are equal, round, and reactive to light.   Cardiovascular:      Rate and Rhythm: Normal rate and regular rhythm.      Pulses: Normal pulses.   Pulmonary:      Effort: Pulmonary effort is normal.   Abdominal:      Palpations: Abdomen is soft.   Skin:     General: Skin is warm.   Neurological:      General: No focal deficit present.      Mental Status: She is alert.       Results Review:    I reviewed the patient's new clinical results.      Assessment & Plan       Angina pectoris    Abnormal nuclear stress test      Assessment & Plan plan for cardiac cath and possible PCI    I discussed the patients findings and my recommendations with patient    José Smith MD  09/19/23  09:59 EDT

## 2023-09-19 NOTE — DISCHARGE INSTRUCTIONS
Post Cath Instructions    Follow up with Dr. Smith in 1 month. Call office to schedule appt.     Drink plenty of fluids for the next 24 hours.  This helps to eliminate the dye used in your procedure through urination.  You may resume a normal diet; however, try to avoid foods that would cause gas or constipation.    Sedative medication given to you during your catheterization may decrease your judgement and reaction time for up to 24-48 hours.  Therefore:  DO NOT drive or operate hazardous machinery (48 hours)  DO NOT consume alcoholic beverages  DO NOT make any important/legal decisions  Have someone stay with you for at least 24 hours    To allow proper healing and prevent bleeding, the following activities are to be strictly avoided for the next 24-48 hours:  Excessive bending at wound site  Straining (anything that would tense up muscles around the affected puncture site)  Lifting objects greater than 5 pounds, pushing, or pulling for 5 days  For Arm Cases:  No flexing at the puncture site, such as hammering, golfing, bowling, or swinging any objects  For Groin Cases:  Refrain from sexual activity  Refrain from running or vigorous walking  No prolonged sitting or standing  Limit stair climbing as much as possible    Keep the puncture site clean and dry.  You may remove the dressing tomorrow and replace it with a band-aid for at least one additional day.  Gently clean the site with mild soap and water.  No scrubbing/rubbing and lightly pat the area dry.  Showers are acceptable; however, avoid submerging in water (tub baths, hot tubs, swimming pools, dishwater, etc…) for at least one week.  The site should be completely healed before resuming these activities to reduce the risk of infection.  Check the site often.  Watch for signs and symptoms of infection and notify your physician if any of the following occur:  Bleeding or an increase in swelling at the puncture site  Fever  Increased soreness around puncture  site  Foul odor or significant drainage from the puncture site  Swelling, redness, or warmth at the puncture site    **A bruise or small “pea sized” lump under the skin at the puncture site is not unusual.  This should disappear within 3-4 weeks.**  CONTACT YOUR PHYSICIAN OR CALL 911 IF YOU EXPERIENCE ANY OF THE FOLLOWING:  Increased angina (chest pain) or frequent sensations of pressure, burning, pain, or other discomfort in the chest, arm, jaws, or stomach  Lightheadedness, dizziness, faint feeling, sweating, or difficulty breathing  Odd sensation changes like numbness, tingling, coldness, or pain in the arm or leg in which the catheter was inserted  Limb in which the catheter was inserted becomes pale/bluish in color    IMPORTANT:  Although this occurs very rarely, if you should develop bright red or excessive bleeding, feel a “pop” inside at the insertion site, or notice a sudden increase in swelling larger than a walnut, you should call 911.  Hold continuous firm pressure to the access site until emergency personnel arrive.  It is best if someone else can do this for you.

## 2023-09-22 ENCOUNTER — ANTICOAGULATION VISIT (OUTPATIENT)
Dept: CARDIOLOGY | Facility: CLINIC | Age: 78
End: 2023-09-22
Payer: MEDICARE

## 2023-09-22 VITALS
WEIGHT: 219 LBS | SYSTOLIC BLOOD PRESSURE: 165 MMHG | DIASTOLIC BLOOD PRESSURE: 84 MMHG | BODY MASS INDEX: 37.59 KG/M2 | HEART RATE: 92 BPM

## 2023-09-22 DIAGNOSIS — Z79.01 LONG TERM (CURRENT) USE OF ANTICOAGULANTS: ICD-10-CM

## 2023-09-22 DIAGNOSIS — I48.20 CHRONIC ATRIAL FIBRILLATION: Primary | ICD-10-CM

## 2023-09-22 LAB — INR PPP: 1.4 (ref 0.9–1.1)

## 2023-09-22 PROCEDURE — 85610 PROTHROMBIN TIME: CPT | Performed by: INTERNAL MEDICINE

## 2023-09-22 PROCEDURE — 36416 COLLJ CAPILLARY BLOOD SPEC: CPT | Performed by: INTERNAL MEDICINE

## 2023-10-03 ENCOUNTER — OFFICE VISIT (OUTPATIENT)
Dept: CARDIOLOGY | Facility: CLINIC | Age: 78
End: 2023-10-03
Payer: MEDICARE

## 2023-10-03 ENCOUNTER — CLINICAL SUPPORT NO REQUIREMENTS (OUTPATIENT)
Dept: CARDIOLOGY | Facility: CLINIC | Age: 78
End: 2023-10-03
Payer: MEDICARE

## 2023-10-03 ENCOUNTER — TELEPHONE (OUTPATIENT)
Dept: CARDIOLOGY | Facility: CLINIC | Age: 78
End: 2023-10-03
Payer: MEDICARE

## 2023-10-03 ENCOUNTER — ANTICOAGULATION VISIT (OUTPATIENT)
Dept: CARDIOLOGY | Facility: CLINIC | Age: 78
End: 2023-10-03
Payer: MEDICARE

## 2023-10-03 VITALS
SYSTOLIC BLOOD PRESSURE: 118 MMHG | WEIGHT: 220 LBS | HEIGHT: 64 IN | HEART RATE: 86 BPM | DIASTOLIC BLOOD PRESSURE: 63 MMHG | BODY MASS INDEX: 37.56 KG/M2

## 2023-10-03 VITALS
BODY MASS INDEX: 37.76 KG/M2 | WEIGHT: 220 LBS | SYSTOLIC BLOOD PRESSURE: 118 MMHG | DIASTOLIC BLOOD PRESSURE: 63 MMHG | HEART RATE: 86 BPM

## 2023-10-03 DIAGNOSIS — I48.20 CHRONIC ATRIAL FIBRILLATION: Primary | ICD-10-CM

## 2023-10-03 DIAGNOSIS — R00.1 BRADYCARDIA: ICD-10-CM

## 2023-10-03 DIAGNOSIS — Z95.0 PACEMAKER: Primary | ICD-10-CM

## 2023-10-03 DIAGNOSIS — Z79.01 LONG TERM (CURRENT) USE OF ANTICOAGULANTS: ICD-10-CM

## 2023-10-03 DIAGNOSIS — Z95.0 PACEMAKER: ICD-10-CM

## 2023-10-03 LAB — INR PPP: 2.6 (ref 2–3)

## 2023-10-03 PROCEDURE — 36416 COLLJ CAPILLARY BLOOD SPEC: CPT | Performed by: INTERNAL MEDICINE

## 2023-10-03 PROCEDURE — 85610 PROTHROMBIN TIME: CPT | Performed by: INTERNAL MEDICINE

## 2023-10-03 NOTE — TELEPHONE ENCOUNTER
Patient will need to follow up in one month from the heart cath with Dr Smith due to Dr Smith being the one who did the procedure.    Please and thank you!

## 2023-10-03 NOTE — TELEPHONE ENCOUNTER
Caller: SHARAN GOODWIN    Relationship to patient: Emergency Contact    Best call back number: [phone]    Chief complaint: NEEDING TO SCHEDULE MONTH FOLLOW UP FOR CATH FROM 9.19.23    Type of visit: FOLLOW UP    Requested date: ASAP     If rescheduling, when is the original appointment:      Additional notes:NONE

## 2023-10-03 NOTE — TELEPHONE ENCOUNTER
Pt just saw Dr Diallo in office 10/3/23 and has a follow up scheduled for Nov. Does pt need an appointment to see Dr Smith? Did Dr. Diallo discuss or follow up on the pt's cath at today's visit?

## 2023-10-03 NOTE — TELEPHONE ENCOUNTER
I SPOKE WITH PT'S EMERGENCY CONTACT SHARAN GOODWIN TO SCHEDULE FOLLOW UP WITH DR MARTINEZ FOR 10/23/23.

## 2023-10-03 NOTE — PROGRESS NOTES
Progress note      Name: Jasmin Mehta ADMIT: (Not on file)   : 1945  PCP: Warren August MD    MRN: [id number] LOS: 0 days   AGE/SEX: 77 y.o. female  ROOM: Room/bed info not found     No chief complaint on file.      Subjective       History of present illness  Jasmin Mehta is a 77-year-old female patient who has chronic atrial fibrillation, dual-chamber pacemaker placed in VVIR mode due to chronic A-fib, history of hypertension and DVT, here today for follow-up.  She is doing well denies having any chest pain or shortness of breath no palpitations, she has occasional lower extremity edema but not severe, no syncopal episodes.  Overall she feels well.    Past Medical History:   Diagnosis Date    Arm fracture, right 2020    Pt seen Dr. Alexander    Arrhythmia     Atrial fibrillation     CHF (congestive heart failure)     Chronic coronary artery disease     DVT (deep venous thrombosis)     and venous filter    Fracture of wrist     Fracture, radius     Heart valve disease     Hypertension     Primary osteoarthritis of right knee 2021    Stroke      Past Surgical History:   Procedure Laterality Date    BARIATRIC SURGERY      CARDIAC CATHETERIZATION  2012    CARDIAC CATHETERIZATION Right 2023    Procedure: Left Heart Cath, possible PCI;  Surgeon: José Smith MD;  Location: [facility] CATH INVASIVE LOCATION;  Service: Cardiovascular;  Laterality: Right;    CARDIAC CATHETERIZATION N/A 2023    Procedure: Left ventriculography;  Surgeon: José Smith MD;  Location: [facility] CATH INVASIVE LOCATION;  Service: Cardiovascular;  Laterality: N/A;    CARDIAC CATHETERIZATION N/A 2023    Procedure: Coronary angiography;  Surgeon: José Smith MD;  Location: [facility] CATH INVASIVE LOCATION;  Service: Cardiovascular;  Laterality: N/A;    CARDIAC ELECTROPHYSIOLOGY PROCEDURE N/A 2020    Procedure: PPM generator change - dual;  Surgeon: Valente Duong MD;  Location: [facility] CATH  INVASIVE LOCATION;  Service: Cardiovascular;  Laterality: N/A;    CARDIAC ELECTROPHYSIOLOGY PROCEDURE N/A 07/31/2020    Procedure: Pocket Revision;  Surgeon: Valente Duong MD;  Location: [facility] INVASIVE LOCATION;  Service: Cardiovascular;  Laterality: N/A;    CARDIOVERSION      CARDIOVERSION X 3    CHOLECYSTECTOMY      GASTRIC BYPASS  2005    HERNIA REPAIR      INSERT / REPLACE / REMOVE PACEMAKER      OTHER SURGICAL HISTORY      RF ablation failure because of lack of RA access    PACEMAKER IMPLANTATION  08/15/2012    permanent dual chamber - medtronic MRI compatible    TONSILLECTOMY      TRIGGER POINT INJECTION  4/2023     Family History   Problem Relation Age of Onset    Stroke Father     Stroke Other     Heart disease Other         FH - MI    Breast cancer Mother     Heart attack Mother     Cancer Mother      Social History     Tobacco Use    Smoking status: Never    Smokeless tobacco: Never   Vaping Use    Vaping Use: Never used   Substance Use Topics    Alcohol use: Never    Drug use: Never       Current Outpatient Medications:     calcium carbonate (OS-MEKA) 600 MG tablet, Take 1 tablet by mouth Daily., Disp: , Rfl:     dilTIAZem CD (CARDIZEM CD) 180 MG 24 hr capsule, TAKE 1 CAPSULE DAILY, Disp: 90 capsule, Rfl: 3    esomeprazole (nexIUM) 40 MG capsule, Take 1 capsule by mouth Every Morning Before Breakfast., Disp: , Rfl:     furosemide (LASIX) 20 MG tablet, TAKE 1 TABLET TWICE A DAY, Disp: 180 tablet, Rfl: 2    Lysine 500 MG capsule, Take  by mouth., Disp: , Rfl:     metoprolol tartrate (LOPRESSOR) 25 MG tablet, Take 1 tablet by mouth 2 (Two) Times a Day., Disp: , Rfl:     Multiple Vitamin (MULTI-VITAMIN DAILY PO), Take  by mouth., Disp: , Rfl:     Omega-3 1000 MG capsule, Take  by mouth., Disp: , Rfl:     spironolactone (ALDACTONE) 25 MG tablet, , Disp: , Rfl:     warfarin (Jantoven) 4 MG tablet, TAKE 2 TABLETS ON SUNDAY,  TUESDAY, AND THURSDAY; TAKE1 AND 1/2 TABLETS ALL OTHERDAYS, OR AS DIRECTED,  Disp: 150 tablet, Rfl: 0  Allergies:  Lactose intolerance (gi) and Lactulose      Physical Exam  VITALS REVIEWED    General:      well developed, in no acute distress.    Head:      normocephalic and atraumatic.    Eyes:      PERRL/EOM intact, conjunctiva and sclera clear with out nystagmus.    Neck:      no masses, thyromegaly,  trachea central with normal respiratory effort and PMI displaced laterally  Lungs:      Clear to auscultation bilaterally  Heart:       Irregular rhythm  Msk:      no deformity or scoliosis noted of thoracic or lumbar spine.    Pulses:      pulses normal in all 4 extremities.    Extremities:       No lower extremity edema  Neurologic:      no focal deficits.   alert oriented x3  Skin:      intact without lesions or rashes.    Psych:      alert and cooperative; normal mood and affect; normal attention span and concentration.      Result Review :               Pertinent cardiac workup    Heart cath 9/19/2023 normal coronaries      Procedures        Assessment and Plan      Jasmin Mehta is a 77-year-old female patient who has chronic atrial fibrillation, has a dual-chamber pacemaker set at VVIR, here today for follow-up.  She is doing well, no anginal symptoms no CHF symptoms, her blood pressure is well controlled so is her heart rate.  She is on Cardizem and metoprolol for rate control, she does take Coumadin for stroke prevention, no bleeding issues.  For now continue same therapy, we will see him in follow-up in 6 months with device interrogation.    Diagnoses and all orders for this visit:    1. Chronic atrial fibrillation (Primary)    2. Pacemaker    3. Long term (current) use of anticoagulants [Z79.01]           Return in about 6 months (around 4/3/2024).  Patient was given instructions and counseling regarding her condition or for health maintenance advice. Please see specific information pulled into the AVS if appropriate.

## 2023-10-16 RX ORDER — DILTIAZEM HYDROCHLORIDE 180 MG/1
CAPSULE, COATED, EXTENDED RELEASE ORAL
Qty: 90 CAPSULE | Refills: 3 | Status: SHIPPED | OUTPATIENT
Start: 2023-10-16

## 2023-10-16 NOTE — TELEPHONE ENCOUNTER
Rx Refill Note  Requested Prescriptions     Pending Prescriptions Disp Refills    dilTIAZem CD (CARDIZEM CD) 180 MG 24 hr capsule [Pharmacy Med Name: DILTIAZEM CD CAP 180MG/24] 90 capsule 3     Sig: TAKE 1 CAPSULE DAILY      Last office visit with prescribing clinician: 10/3/2023   Last telemedicine visit with prescribing clinician: Visit date not found   Next office visit with prescribing clinician: 4/17/2024                           Chantell Wayne MA  10/16/23, 08:34 EDT

## 2023-10-23 ENCOUNTER — OFFICE VISIT (OUTPATIENT)
Dept: CARDIOLOGY | Facility: CLINIC | Age: 78
End: 2023-10-23
Payer: MEDICARE

## 2023-10-23 VITALS
OXYGEN SATURATION: 97 % | BODY MASS INDEX: 37.73 KG/M2 | DIASTOLIC BLOOD PRESSURE: 69 MMHG | HEIGHT: 64 IN | WEIGHT: 221 LBS | SYSTOLIC BLOOD PRESSURE: 132 MMHG | HEART RATE: 61 BPM

## 2023-10-23 DIAGNOSIS — I10 PRIMARY HYPERTENSION: ICD-10-CM

## 2023-10-23 DIAGNOSIS — I48.20 CHRONIC ATRIAL FIBRILLATION: Primary | ICD-10-CM

## 2023-10-23 PROCEDURE — 99214 OFFICE O/P EST MOD 30 MIN: CPT | Performed by: INTERNAL MEDICINE

## 2023-10-23 PROCEDURE — 1159F MED LIST DOCD IN RCRD: CPT | Performed by: INTERNAL MEDICINE

## 2023-10-23 PROCEDURE — 3078F DIAST BP <80 MM HG: CPT | Performed by: INTERNAL MEDICINE

## 2023-10-23 PROCEDURE — 3075F SYST BP GE 130 - 139MM HG: CPT | Performed by: INTERNAL MEDICINE

## 2023-10-23 PROCEDURE — 1160F RVW MEDS BY RX/DR IN RCRD: CPT | Performed by: INTERNAL MEDICINE

## 2023-10-23 NOTE — PROGRESS NOTES
Subjective:     Encounter Date:10/23/2023      Patient ID: Jasmin Mehta is a 78 y.o. female.    Chief Complaint:  History of Present Illness 78-year-old white female with history of chronic atrial fibrillation hypertension presents to my office for follow-up.  Patient is currently stable without any symptoms of chest pain or shortness of breath at rest or exertion.  No complains any PND orthopnea.  No palpitation dizziness syncope or swelling of the feet.  Patient is taking all her meds regular.  Patient does not smoke.    The following portions of the patient's history were reviewed and updated as appropriate: allergies, current medications, past family history, past medical history, past social history, past surgical history, and problem list.  Past Medical History:   Diagnosis Date    Arm fracture, right 05/2020    Pt seen Dr. Alexander    Arrhythmia     Atrial fibrillation     CHF (congestive heart failure)     Chronic coronary artery disease     DVT (deep venous thrombosis)     and venous filter    Fracture of wrist 2020    Fracture, radius     Heart valve disease     Hypertension     Primary osteoarthritis of right knee 09/13/2021    Stroke      Past Surgical History:   Procedure Laterality Date    BARIATRIC SURGERY  2005    CARDIAC CATHETERIZATION  02/01/2012    CARDIAC CATHETERIZATION Right 9/19/2023    Procedure: Left Heart Cath, possible PCI;  Surgeon: José Smith MD;  Location: [facility] CATH INVASIVE LOCATION;  Service: Cardiovascular;  Laterality: Right;    CARDIAC CATHETERIZATION N/A 9/19/2023    Procedure: Left ventriculography;  Surgeon: José Smith MD;  Location: [facility] CATH INVASIVE LOCATION;  Service: Cardiovascular;  Laterality: N/A;    CARDIAC CATHETERIZATION N/A 9/19/2023    Procedure: Coronary angiography;  Surgeon: José Smith MD;  Location: [facility] CATH INVASIVE LOCATION;  Service: Cardiovascular;  Laterality: N/A;    CARDIAC ELECTROPHYSIOLOGY PROCEDURE N/A 07/31/2020    Procedure: PPM  "generator change - dual;  Surgeon: Valente Duong MD;  Location: [facility] CATH INVASIVE LOCATION;  Service: Cardiovascular;  Laterality: N/A;    CARDIAC ELECTROPHYSIOLOGY PROCEDURE N/A 07/31/2020    Procedure: Pocket Revision;  Surgeon: Valente Duong MD;  Location:  [facility] CATH INVASIVE LOCATION;  Service: Cardiovascular;  Laterality: N/A;    CARDIOVERSION      CARDIOVERSION X 3    CHOLECYSTECTOMY      GASTRIC BYPASS  2005    HERNIA REPAIR      INSERT / REPLACE / REMOVE PACEMAKER      OTHER SURGICAL HISTORY      RF ablation failure because of lack of RA access    PACEMAKER IMPLANTATION  08/15/2012    permanent dual chamber - medtronic MRI compatible    TONSILLECTOMY      TRIGGER POINT INJECTION  4/2023     /69   Pulse 61   Ht 162.6 cm (64.02\")   Wt 100 kg (221 lb)   LMP  (LMP Unknown)   SpO2 97%   BMI 37.92 kg/m²   Family History   Problem Relation Age of Onset    Stroke Father     Stroke Other     Heart disease Other         FH - MI    Breast cancer Mother     Heart attack Mother     Cancer Mother        Current Outpatient Medications:     calcium carbonate (OS-MEKA) 600 MG tablet, Take 1 tablet by mouth Daily., Disp: , Rfl:     dilTIAZem CD (CARDIZEM CD) 180 MG 24 hr capsule, TAKE 1 CAPSULE DAILY, Disp: 90 capsule, Rfl: 3    esomeprazole (nexIUM) 40 MG capsule, Take 1 capsule by mouth Every Morning Before Breakfast., Disp: , Rfl:     furosemide (LASIX) 20 MG tablet, TAKE 1 TABLET TWICE A DAY, Disp: 180 tablet, Rfl: 2    Lysine 500 MG capsule, Take  by mouth., Disp: , Rfl:     metoprolol tartrate (LOPRESSOR) 25 MG tablet, Take 1 tablet by mouth 2 (Two) Times a Day., Disp: , Rfl:     Multiple Vitamin (MULTI-VITAMIN DAILY PO), Take  by mouth., Disp: , Rfl:     Omega-3 1000 MG capsule, Take  by mouth., Disp: , Rfl:     spironolactone (ALDACTONE) 25 MG tablet, , Disp: , Rfl:     warfarin (Jantoven) 4 MG tablet, TAKE 2 TABLETS ON SUNDAY,  TUESDAY, AND THURSDAY; TAKE1 AND 1/2 TABLETS ALL OTHERDAYS, OR AS " DIRECTED, Disp: 150 tablet, Rfl: 0  Allergies   Allergen Reactions    Lactose Intolerance (Gi) GI Intolerance     Severe stomach pain    Lactulose GI Intolerance     Severe stomach pain     Social History     Socioeconomic History    Marital status:    Tobacco Use    Smoking status: Never    Smokeless tobacco: Never   Vaping Use    Vaping Use: Never used   Substance and Sexual Activity    Alcohol use: Never    Drug use: Never    Sexual activity: Not Currently     Partners: Male     Birth control/protection: Post-menopausal, None     Review of Systems   Constitutional: Negative for malaise/fatigue.   Cardiovascular:  Negative for chest pain, dyspnea on exertion, leg swelling and palpitations.   Respiratory:  Negative for cough and shortness of breath.    Gastrointestinal:  Negative for abdominal pain, nausea and vomiting.   Neurological:  Negative for dizziness, focal weakness, headaches, light-headedness and numbness.   All other systems reviewed and are negative.             Objective:     Constitutional:       Appearance: Well-developed.   Eyes:      General: No scleral icterus.     Conjunctiva/sclera: Conjunctivae normal.   HENT:      Head: Normocephalic and atraumatic.   Neck:      Vascular: No carotid bruit or JVD.   Pulmonary:      Effort: Pulmonary effort is normal.      Breath sounds: Normal breath sounds. No wheezing. No rales.   Cardiovascular:      Normal rate. Irregularly irregular rhythm.   Pulses:     Intact distal pulses.   Abdominal:      General: Bowel sounds are normal.      Palpations: Abdomen is soft.   Musculoskeletal:      Cervical back: Normal range of motion and neck supple. Skin:     General: Skin is warm and dry.      Findings: No rash.   Neurological:      Mental Status: Alert.       Procedures    Lab Review:         MDM    #1 atrial fibrillation  Patient is chronic atrial fibrillation is currently stable on medications including diltiazem metoprolol and warfarin keeps the INR  around 2-2.5  2.  Hypertension  Patient blood pressure currently stable on diltiazem and metoprolol and spironolactone.    Patient's previous medical records, labs, and EKG were reviewed and discussed with the patient at today's visit.

## 2023-11-07 ENCOUNTER — ANTICOAGULATION VISIT (OUTPATIENT)
Dept: CARDIOLOGY | Facility: CLINIC | Age: 78
End: 2023-11-07
Payer: MEDICARE

## 2023-11-07 VITALS — HEART RATE: 69 BPM | SYSTOLIC BLOOD PRESSURE: 136 MMHG | DIASTOLIC BLOOD PRESSURE: 65 MMHG

## 2023-11-07 DIAGNOSIS — I48.20 CHRONIC ATRIAL FIBRILLATION: Primary | ICD-10-CM

## 2023-11-07 DIAGNOSIS — Z79.01 LONG TERM (CURRENT) USE OF ANTICOAGULANTS: ICD-10-CM

## 2023-11-07 LAB — INR PPP: 2.9 (ref 2–3)

## 2023-11-07 PROCEDURE — 85610 PROTHROMBIN TIME: CPT | Performed by: INTERNAL MEDICINE

## 2023-11-07 PROCEDURE — 36416 COLLJ CAPILLARY BLOOD SPEC: CPT | Performed by: INTERNAL MEDICINE

## 2023-11-19 DIAGNOSIS — I48.0 PAROXYSMAL ATRIAL FIBRILLATION: ICD-10-CM

## 2023-11-19 DIAGNOSIS — Z79.01 LONG TERM (CURRENT) USE OF ANTICOAGULANTS: ICD-10-CM

## 2023-11-20 RX ORDER — WARFARIN SODIUM 4 MG/1
TABLET ORAL
Qty: 150 TABLET | Refills: 0 | Status: SHIPPED | OUTPATIENT
Start: 2023-11-20

## 2023-11-20 RX ORDER — FUROSEMIDE 20 MG/1
TABLET ORAL
Qty: 180 TABLET | Refills: 3 | Status: SHIPPED | OUTPATIENT
Start: 2023-11-20

## 2023-11-20 NOTE — TELEPHONE ENCOUNTER
Rx Refill Note  Requested Prescriptions     Pending Prescriptions Disp Refills    furosemide (LASIX) 20 MG tablet [Pharmacy Med Name: FUROSEMIDE TAB 20MG] 180 tablet 3     Sig: TAKE 1 TABLET TWICE A DAY      Last office visit with prescribing clinician: 10/3/2023   Last telemedicine visit with prescribing clinician: Visit date not found   Next office visit with prescribing clinician: 04/17/2024                         Maida Blevins MA  11/20/23, 08:50 EST

## 2023-11-20 NOTE — TELEPHONE ENCOUNTER
Rx Refill Note  Requested Prescriptions     Pending Prescriptions Disp Refills    warfarin (Jantoven) 4 MG tablet [Pharmacy Med Name: JANTOVEN TAB 4MG] 150 tablet 0     Sig: TAKE 2 TABLETS ON SUNDAY,  TUESDAY, AND THURSDAY; TAKE1 AND 1/2 TABLETS ALL OTHERDAYS, OR AS DIRECTED    Last INR 11/7/23  Last office visit with prescribing clinician: 10/3/2023   Last telemedicine visit with prescribing clinician: Visit date not found   Next office visit with prescribing clinician: 4/17/2024                         Would you like a call back once the refill request has been completed: [] Yes [] No    If the office needs to give you a call back, can they leave a voicemail: [] Yes [] No    Shahla Abraham RN  11/20/23, 09:42 EST

## 2023-12-12 ENCOUNTER — ANTICOAGULATION VISIT (OUTPATIENT)
Dept: CARDIOLOGY | Facility: CLINIC | Age: 78
End: 2023-12-12
Payer: MEDICARE

## 2023-12-12 VITALS
DIASTOLIC BLOOD PRESSURE: 61 MMHG | WEIGHT: 217 LBS | BODY MASS INDEX: 37.23 KG/M2 | SYSTOLIC BLOOD PRESSURE: 118 MMHG | HEART RATE: 79 BPM

## 2023-12-12 DIAGNOSIS — I48.20 CHRONIC ATRIAL FIBRILLATION: Primary | ICD-10-CM

## 2023-12-12 DIAGNOSIS — Z79.01 LONG TERM (CURRENT) USE OF ANTICOAGULANTS: ICD-10-CM

## 2023-12-12 LAB — INR PPP: 3.2 (ref 0.9–1.1)

## 2023-12-12 PROCEDURE — 36416 COLLJ CAPILLARY BLOOD SPEC: CPT | Performed by: INTERNAL MEDICINE

## 2023-12-12 PROCEDURE — 85610 PROTHROMBIN TIME: CPT | Performed by: INTERNAL MEDICINE

## 2023-12-12 NOTE — PROGRESS NOTES
Pts INR was slightly elevated at 3.2. Decrease dosage to 8 mgs on Tues and Thurs with 6 mgs all other days. Recheck INR in a month. [email or web address]

## 2024-01-16 ENCOUNTER — ANTICOAGULATION VISIT (OUTPATIENT)
Dept: CARDIOLOGY | Facility: CLINIC | Age: 79
End: 2024-01-16
Payer: MEDICARE

## 2024-01-16 VITALS
BODY MASS INDEX: 37.57 KG/M2 | SYSTOLIC BLOOD PRESSURE: 116 MMHG | WEIGHT: 219 LBS | DIASTOLIC BLOOD PRESSURE: 71 MMHG | HEART RATE: 68 BPM

## 2024-01-16 DIAGNOSIS — I48.20 CHRONIC ATRIAL FIBRILLATION: Primary | ICD-10-CM

## 2024-01-16 DIAGNOSIS — Z79.01 LONG TERM (CURRENT) USE OF ANTICOAGULANTS: ICD-10-CM

## 2024-01-16 LAB — INR PPP: 2.2 (ref 2–3)

## 2024-01-16 PROCEDURE — 85610 PROTHROMBIN TIME: CPT | Performed by: INTERNAL MEDICINE

## 2024-01-16 PROCEDURE — 36416 COLLJ CAPILLARY BLOOD SPEC: CPT | Performed by: INTERNAL MEDICINE

## 2024-02-26 ENCOUNTER — TELEPHONE (OUTPATIENT)
Dept: CARDIOLOGY | Facility: CLINIC | Age: 79
End: 2024-02-26
Payer: MEDICARE

## 2024-02-26 RX ORDER — FUROSEMIDE 20 MG/1
20 TABLET ORAL 2 TIMES DAILY
Qty: 180 TABLET | Refills: 3 | Status: SHIPPED | OUTPATIENT
Start: 2024-02-26

## 2024-02-26 NOTE — TELEPHONE ENCOUNTER
Incoming Refill Request      Medication requested (name and dose): FUROSEMIDE 20 MG    Pharmacy where request should be sent: [facility]    Additional details provided by patient:     Best call back number: [phone]    Does the patient have less than a 3 day supply:  [] Yes  [x] No    Misa Valdovinos Rep  02/26/24, 14:07 EST

## 2024-02-26 NOTE — TELEPHONE ENCOUNTER
Rx Refill Note  Requested Prescriptions     Signed Prescriptions Disp Refills    furosemide (LASIX) 20 MG tablet 180 tablet 3     Sig: Take 1 tablet by mouth 2 (Two) Times a Day.     Authorizing Provider: DAT ESTRADA     Ordering User: PAU LANCE      Last office visit with prescribing clinician: 10/3/2023   Last telemedicine visit with prescribing clinician: Visit date not found   Next office visit with prescribing clinician: 4/17/2024                         Would you like a call back once the refill request has been completed: [] Yes [] No    If the office needs to give you a call back, can they leave a voicemail: [] Yes [] No    Pau Lance MA  02/26/24, 14:10 EST

## 2024-02-27 ENCOUNTER — ANTICOAGULATION VISIT (OUTPATIENT)
Dept: CARDIOLOGY | Facility: CLINIC | Age: 79
End: 2024-02-27
Payer: MEDICARE

## 2024-02-27 VITALS
BODY MASS INDEX: 37.74 KG/M2 | WEIGHT: 220 LBS | SYSTOLIC BLOOD PRESSURE: 143 MMHG | DIASTOLIC BLOOD PRESSURE: 70 MMHG | HEART RATE: 91 BPM

## 2024-02-27 DIAGNOSIS — Z79.01 LONG TERM (CURRENT) USE OF ANTICOAGULANTS: ICD-10-CM

## 2024-02-27 DIAGNOSIS — I48.20 CHRONIC ATRIAL FIBRILLATION: Primary | ICD-10-CM

## 2024-02-27 LAB — INR PPP: 2.1 (ref 2–3)

## 2024-02-27 PROCEDURE — 85610 PROTHROMBIN TIME: CPT | Performed by: INTERNAL MEDICINE

## 2024-02-27 PROCEDURE — 36416 COLLJ CAPILLARY BLOOD SPEC: CPT | Performed by: INTERNAL MEDICINE

## 2024-03-25 ENCOUNTER — TELEPHONE (OUTPATIENT)
Dept: CARDIOLOGY | Facility: CLINIC | Age: 79
End: 2024-03-25
Payer: MEDICARE

## 2024-03-25 DIAGNOSIS — I48.0 PAROXYSMAL ATRIAL FIBRILLATION: ICD-10-CM

## 2024-03-25 DIAGNOSIS — Z79.01 LONG TERM (CURRENT) USE OF ANTICOAGULANTS: ICD-10-CM

## 2024-03-25 RX ORDER — WARFARIN SODIUM 4 MG/1
TABLET ORAL
Qty: 150 TABLET | Refills: 0 | Status: SHIPPED | OUTPATIENT
Start: 2024-03-25

## 2024-03-25 NOTE — TELEPHONE ENCOUNTER
Rx Refill Note  Requested Prescriptions     Pending Prescriptions Disp Refills    warfarin (Jantoven) 4 MG tablet 150 tablet 0     Sig: Take 1 1/2 tabs, by mouth, daily except 2 tabs on Tues and Thurs or as directed.   Last INR 2/27/24   Last office visit with prescribing clinician: 10/23/2023   Last telemedicine visit with prescribing clinician: Visit date not found   Next office visit with prescribing clinician: 4/24/2024                         Would you like a call back once the refill request has been completed: [] Yes [] No    If the office needs to give you a call back, can they leave a voicemail: [] Yes [] No    Shahla Abraham RN  03/25/24, 14:14 EDT

## 2024-03-25 NOTE — TELEPHONE ENCOUNTER
Incoming Refill Request      Medication requested (name and dose): jentavin 4 mg    Pharmacy where request should be sent: [facility] mail order    Additional details provided by patient:     Best call back number: [phone]    Does the patient have less than a 3 day supply:  [x] Yes  [] No    Misa Ponce Rep  03/25/24, 13:29 EDT

## 2024-04-17 ENCOUNTER — CLINICAL SUPPORT NO REQUIREMENTS (OUTPATIENT)
Dept: CARDIOLOGY | Facility: CLINIC | Age: 79
End: 2024-04-17
Payer: MEDICARE

## 2024-04-17 ENCOUNTER — OFFICE VISIT (OUTPATIENT)
Dept: CARDIOLOGY | Facility: CLINIC | Age: 79
End: 2024-04-17
Payer: MEDICARE

## 2024-04-17 ENCOUNTER — ANTICOAGULATION VISIT (OUTPATIENT)
Dept: CARDIOLOGY | Facility: CLINIC | Age: 79
End: 2024-04-17
Payer: MEDICARE

## 2024-04-17 VITALS
SYSTOLIC BLOOD PRESSURE: 146 MMHG | DIASTOLIC BLOOD PRESSURE: 68 MMHG | HEART RATE: 72 BPM | HEIGHT: 64 IN | BODY MASS INDEX: 37.22 KG/M2 | WEIGHT: 218 LBS

## 2024-04-17 VITALS
WEIGHT: 218 LBS | BODY MASS INDEX: 37.4 KG/M2 | HEART RATE: 72 BPM | DIASTOLIC BLOOD PRESSURE: 68 MMHG | SYSTOLIC BLOOD PRESSURE: 146 MMHG

## 2024-04-17 DIAGNOSIS — I10 PRIMARY HYPERTENSION: ICD-10-CM

## 2024-04-17 DIAGNOSIS — R00.1 BRADYCARDIA: Primary | ICD-10-CM

## 2024-04-17 DIAGNOSIS — Z79.01 LONG TERM (CURRENT) USE OF ANTICOAGULANTS: ICD-10-CM

## 2024-04-17 DIAGNOSIS — I48.20 CHRONIC ATRIAL FIBRILLATION: Primary | ICD-10-CM

## 2024-04-17 DIAGNOSIS — Z95.0 PACEMAKER: ICD-10-CM

## 2024-04-17 DIAGNOSIS — R00.1 BRADYCARDIA: ICD-10-CM

## 2024-04-17 LAB — INR PPP: 1.9 (ref 2–3)

## 2024-04-17 PROCEDURE — 85610 PROTHROMBIN TIME: CPT | Performed by: INTERNAL MEDICINE

## 2024-04-17 PROCEDURE — 36416 COLLJ CAPILLARY BLOOD SPEC: CPT | Performed by: INTERNAL MEDICINE

## 2024-04-17 NOTE — PROGRESS NOTES
Progress note      Name: Jasmin Mehta ADMIT: (Not on file)   : 1945  PCP: Warren August MD    MRN: [id number] LOS: 0 days   AGE/SEX: 78 y.o. female  ROOM: Room/bed info not found     Chief Complaint   Patient presents with    Follow-up     6mth device chk       Subjective       History of present illness  Jasmin Mehta is a 78-year-old female patient who has chronic atrial fibrillation, dual-chamber pacemaker set VVIR due to chronic A-fib, history of hypertension and DVT, here today for follow-up.  She is doing well denies having any chest pain or shortness of breath, no significant lower extremity edema no palpitations no syncopal episodes.    Past Medical History:   Diagnosis Date    Arm fracture, right 2020    Pt seen Dr. Alexander    Arrhythmia     Atrial fibrillation     CHF (congestive heart failure)     Chronic coronary artery disease     DVT (deep venous thrombosis)     and venous filter    Fracture of wrist 2020    Fracture, radius     Heart valve disease     Hypertension     Primary osteoarthritis of right knee 2021    Stroke      Past Surgical History:   Procedure Laterality Date    BARIATRIC SURGERY      CARDIAC CATHETERIZATION  2012    CARDIAC CATHETERIZATION Right 2023    Procedure: Left Heart Cath, possible PCI;  Surgeon: José Smith MD;  Location: [facility] CATH INVASIVE LOCATION;  Service: Cardiovascular;  Laterality: Right;    CARDIAC CATHETERIZATION N/A 2023    Procedure: Left ventriculography;  Surgeon: José Smith MD;  Location: [facility] CATH INVASIVE LOCATION;  Service: Cardiovascular;  Laterality: N/A;    CARDIAC CATHETERIZATION N/A 2023    Procedure: Coronary angiography;  Surgeon: José Smith MD;  Location: [facility] CATH INVASIVE LOCATION;  Service: Cardiovascular;  Laterality: N/A;    CARDIAC ELECTROPHYSIOLOGY PROCEDURE N/A 2020    Procedure: PPM generator change - dual;  Surgeon: Valente Duong MD;  Location: [facility] CATH INVASIVE  LOCATION;  Service: Cardiovascular;  Laterality: N/A;    CARDIAC ELECTROPHYSIOLOGY PROCEDURE N/A 07/31/2020    Procedure: Pocket Revision;  Surgeon: Valente Duong MD;  Location: [facility] INVASIVE LOCATION;  Service: Cardiovascular;  Laterality: N/A;    CARDIOVERSION      CARDIOVERSION X 3    CHOLECYSTECTOMY      GASTRIC BYPASS  2005    HERNIA REPAIR      INSERT / REPLACE / REMOVE PACEMAKER      OTHER SURGICAL HISTORY      RF ablation failure because of lack of RA access    PACEMAKER IMPLANTATION  08/15/2012    permanent dual chamber - medtronic MRI compatible    TONSILLECTOMY      TRIGGER POINT INJECTION  4/2023     Family History   Problem Relation Age of Onset    Stroke Father     Stroke Other     Heart disease Other         FH - MI    Breast cancer Mother     Heart attack Mother     Cancer Mother      Social History     Tobacco Use    Smoking status: Never     Passive exposure: Never    Smokeless tobacco: Never   Vaping Use    Vaping status: Never Used   Substance Use Topics    Alcohol use: Never    Drug use: Never       Current Outpatient Medications:     calcium carbonate (OS-MEKA) 600 MG tablet, Take 1 tablet by mouth Daily., Disp: , Rfl:     dilTIAZem CD (CARDIZEM CD) 180 MG 24 hr capsule, TAKE 1 CAPSULE DAILY, Disp: 90 capsule, Rfl: 3    esomeprazole (nexIUM) 40 MG capsule, Take 1 capsule by mouth Every Morning Before Breakfast., Disp: , Rfl:     furosemide (LASIX) 20 MG tablet, Take 1 tablet by mouth 2 (Two) Times a Day., Disp: 180 tablet, Rfl: 3    metoprolol tartrate (LOPRESSOR) 25 MG tablet, Take 1 tablet by mouth 2 (Two) Times a Day., Disp: , Rfl:     Multiple Vitamin (MULTI-VITAMIN DAILY PO), Take  by mouth., Disp: , Rfl:     Omega-3 1000 MG capsule, Take  by mouth., Disp: , Rfl:     spironolactone (ALDACTONE) 25 MG tablet, , Disp: , Rfl:     warfarin (Jantoven) 4 MG tablet, Take 1 1/2 tabs, by mouth, daily except 2 tabs on Tues and Thurs or as directed., Disp: 150 tablet, Rfl: 0  Allergies:   Lactose intolerance (gi) and Lactulose      Physical Exam  VITALS REVIEWED    General:      well developed, in no acute distress.    Head:      normocephalic and atraumatic.    Eyes:      PERRL/EOM intact, conjunctiva and sclera clear with out nystagmus.    Neck:      no masses, thyromegaly,  trachea central with normal respiratory effort and PMI displaced laterally  Lungs:      Clear to auscultation bilaterally  Heart:       Irregular rhythm  Msk:      no deformity or scoliosis noted of thoracic or lumbar spine.    Pulses:      pulses normal in all 4 extremities.    Extremities:       No lower extremity edema  Neurologic:      no focal deficits.   alert oriented x3  Skin:      intact without lesions or rashes.    Psych:      alert and cooperative; normal mood and affect; normal attention span and concentration.      Result Review :               Pertinent cardiac workup    Heart cath 9/19/2023 normal coronaries  Stress test 8/30/2023 ejection fraction 70%  EKG 8/1/2023 atrial fibrillation with occasional ventricular pacing      Procedures        Assessment and Plan      Jasmin Mehta is a 78-year-old female patient who was no CAD, has chronic A-fib dual-chamber pacemaker set at VVIR, here today for follow-up.  No anginal symptoms no CHF symptoms, her blood pressure at home is usually around 120/80.  She is compliant with her Coumadin therapy with no bleeding issues.  Device interrogation today showed slightly elevated threshold in the V but she is only paced 33%.  No changes today, will see her in 6 months for follow-up.    Diagnoses and all orders for this visit:    1. Chronic atrial fibrillation (Primary)    2. Long term (current) use of anticoagulants [Z79.01]    3. Bradycardia    4. Pacemaker    5. Primary hypertension           Return in about 6 months (around 10/17/2024).  Patient was given instructions and counseling regarding her condition or for health maintenance advice. Please see specific information  pulled into the AVS if appropriate.

## 2024-04-24 ENCOUNTER — OFFICE VISIT (OUTPATIENT)
Dept: CARDIOLOGY | Facility: CLINIC | Age: 79
End: 2024-04-24
Payer: MEDICARE

## 2024-04-24 VITALS
DIASTOLIC BLOOD PRESSURE: 73 MMHG | WEIGHT: 221 LBS | BODY MASS INDEX: 35.52 KG/M2 | HEIGHT: 66 IN | SYSTOLIC BLOOD PRESSURE: 137 MMHG | HEART RATE: 64 BPM | OXYGEN SATURATION: 95 %

## 2024-04-24 DIAGNOSIS — I48.0 PAROXYSMAL ATRIAL FIBRILLATION: ICD-10-CM

## 2024-04-24 DIAGNOSIS — Z95.0 PACEMAKER: ICD-10-CM

## 2024-04-24 DIAGNOSIS — R00.1 BRADYCARDIA: Primary | ICD-10-CM

## 2024-04-24 DIAGNOSIS — E78.00 PURE HYPERCHOLESTEROLEMIA: ICD-10-CM

## 2024-04-24 DIAGNOSIS — I10 PRIMARY HYPERTENSION: ICD-10-CM

## 2024-04-24 PROCEDURE — 1160F RVW MEDS BY RX/DR IN RCRD: CPT | Performed by: INTERNAL MEDICINE

## 2024-04-24 PROCEDURE — 1159F MED LIST DOCD IN RCRD: CPT | Performed by: INTERNAL MEDICINE

## 2024-04-24 PROCEDURE — 3078F DIAST BP <80 MM HG: CPT | Performed by: INTERNAL MEDICINE

## 2024-04-24 PROCEDURE — 99214 OFFICE O/P EST MOD 30 MIN: CPT | Performed by: INTERNAL MEDICINE

## 2024-04-24 PROCEDURE — 3075F SYST BP GE 130 - 139MM HG: CPT | Performed by: INTERNAL MEDICINE

## 2024-04-24 NOTE — PROGRESS NOTES
Subjective:     Encounter Date:04/24/2024      Patient ID: Jasmin Mehta is a 78 y.o. female.    Chief Complaint:  History of Present Illness 78-year-old white female with history of paroxysmal fibrillation history of bradycardia with pacemaker placement and hypertension presents to office for a follow-up.  Patient is currently stable without any symptoms of chest pain or shortness of breath at rest or exertion.  No complaint of any PND or orthopnea.  No palpitation dizziness syncope or patient has no swelling of the feet.  Patient is taking all the medicines regular.  Patient does not smoke.    The following portions of the patient's history were reviewed and updated as appropriate: allergies, current medications, past family history, past medical history, past social history, past surgical history, and problem list.  Past Medical History:   Diagnosis Date    Arm fracture, right 05/2020    Pt seen Dr. Alexander    Arrhythmia     Atrial fibrillation     CHF (congestive heart failure)     Chronic coronary artery disease     DVT (deep venous thrombosis)     and venous filter    Fracture of wrist 2020    Fracture, radius     Heart valve disease     Hypertension     Primary osteoarthritis of right knee 09/13/2021    Stroke      Past Surgical History:   Procedure Laterality Date    BARIATRIC SURGERY  2005    CARDIAC CATHETERIZATION  02/01/2012    CARDIAC CATHETERIZATION Right 9/19/2023    Procedure: Left Heart Cath, possible PCI;  Surgeon: José Smith MD;  Location: [facility] CATH INVASIVE LOCATION;  Service: Cardiovascular;  Laterality: Right;    CARDIAC CATHETERIZATION N/A 9/19/2023    Procedure: Left ventriculography;  Surgeon: José Smith MD;  Location: [facility] CATH INVASIVE LOCATION;  Service: Cardiovascular;  Laterality: N/A;    CARDIAC CATHETERIZATION N/A 9/19/2023    Procedure: Coronary angiography;  Surgeon: José Smith MD;  Location: [facility] CATH INVASIVE LOCATION;  Service: Cardiovascular;  Laterality:  "N/A;    CARDIAC ELECTROPHYSIOLOGY PROCEDURE N/A 07/31/2020    Procedure: PPM generator change - dual;  Surgeon: Valente Duong MD;  Location:  [facility] CATH INVASIVE LOCATION;  Service: Cardiovascular;  Laterality: N/A;    CARDIAC ELECTROPHYSIOLOGY PROCEDURE N/A 07/31/2020    Procedure: Pocket Revision;  Surgeon: Valente Duong MD;  Location:  [facility] CATH INVASIVE LOCATION;  Service: Cardiovascular;  Laterality: N/A;    CARDIOVERSION      CARDIOVERSION X 3    CHOLECYSTECTOMY      GASTRIC BYPASS  2005    HERNIA REPAIR      INSERT / REPLACE / REMOVE PACEMAKER      OTHER SURGICAL HISTORY      RF ablation failure because of lack of RA access    PACEMAKER IMPLANTATION  08/15/2012    permanent dual chamber - medtronic MRI compatible    TONSILLECTOMY      TRIGGER POINT INJECTION  4/2023     /73   Pulse 64   Ht 167.6 cm (66\")   Wt 100 kg (221 lb)   LMP  (LMP Unknown)   SpO2 95%   BMI 35.67 kg/m²   Family History   Problem Relation Age of Onset    Stroke Father     Stroke Other     Heart disease Other         FH - MI    Breast cancer Mother     Heart attack Mother     Cancer Mother        Current Outpatient Medications:     calcium carbonate (OS-MEKA) 600 MG tablet, Take 1 tablet by mouth Daily., Disp: , Rfl:     dilTIAZem CD (CARDIZEM CD) 180 MG 24 hr capsule, TAKE 1 CAPSULE DAILY, Disp: 90 capsule, Rfl: 3    esomeprazole (nexIUM) 40 MG capsule, Take 1 capsule by mouth Every Morning Before Breakfast., Disp: , Rfl:     furosemide (LASIX) 20 MG tablet, Take 1 tablet by mouth 2 (Two) Times a Day., Disp: 180 tablet, Rfl: 3    metoprolol tartrate (LOPRESSOR) 25 MG tablet, Take 1 tablet by mouth 2 (Two) Times a Day., Disp: , Rfl:     Multiple Vitamin (MULTI-VITAMIN DAILY PO), Take  by mouth., Disp: , Rfl:     Omega-3 1000 MG capsule, Take  by mouth., Disp: , Rfl:     spironolactone (ALDACTONE) 25 MG tablet, , Disp: , Rfl:     warfarin (Jantoven) 4 MG tablet, Take 1 1/2 tabs, by mouth, daily except 2 tabs on Tues and " Thurs or as directed., Disp: 150 tablet, Rfl: 0  Allergies   Allergen Reactions    Lactose Intolerance (Gi) GI Intolerance     Severe stomach pain    Lactulose GI Intolerance     Severe stomach pain     Social History     Socioeconomic History    Marital status:    Tobacco Use    Smoking status: Never     Passive exposure: Never    Smokeless tobacco: Never   Vaping Use    Vaping status: Never Used   Substance and Sexual Activity    Alcohol use: Never    Drug use: Never    Sexual activity: Not Currently     Partners: Male     Birth control/protection: Post-menopausal, None     Review of Systems   Constitutional: Negative for malaise/fatigue.   Cardiovascular:  Positive for leg swelling. Negative for chest pain, dyspnea on exertion and palpitations.   Respiratory:  Negative for cough and shortness of breath.    Gastrointestinal:  Negative for abdominal pain, nausea and vomiting.   Neurological:  Positive for numbness. Negative for dizziness, focal weakness, headaches and light-headedness.   All other systems reviewed and are negative.             Objective:     Constitutional:       Appearance: Well-developed.   Eyes:      General: No scleral icterus.     Conjunctiva/sclera: Conjunctivae normal.   HENT:      Head: Normocephalic and atraumatic.   Neck:      Vascular: No carotid bruit or JVD.   Pulmonary:      Effort: Pulmonary effort is normal.      Breath sounds: Normal breath sounds. No wheezing. No rales.   Cardiovascular:      Normal rate. Regular rhythm.   Pulses:     Intact distal pulses.   Edema:     Peripheral edema present.  Abdominal:      General: Bowel sounds are normal.      Palpations: Abdomen is soft.   Musculoskeletal:      Cervical back: Normal range of motion and neck supple. Skin:     General: Skin is warm and dry.      Findings: No rash.   Neurological:      Mental Status: Alert.       Procedures    Lab Review:         MDM    #1 atrial fibrillation  Patient has paroxysmal fibrillation is  currently stable on medications including diltiazem and metoprolol and also on warfarin for anticoagulation keep his INR around 2-2.5  2.  Hypertension  Patient blood pressure currently stable on metoprolol and diltiazem  3.  Hyperlipidemia  Patient is currently on omega-3 fish oil capsules and does not take any statins  4.  Pacemaker placement  Patient has bradycardia which is symptomatic and has had a pacemaker which is working very well.    Patient's previous medical records, labs, and EKG were reviewed and discussed with the patient at today's visit.

## 2024-05-22 ENCOUNTER — ANTICOAGULATION VISIT (OUTPATIENT)
Dept: CARDIOLOGY | Facility: CLINIC | Age: 79
End: 2024-05-22
Payer: MEDICARE

## 2024-05-22 VITALS
DIASTOLIC BLOOD PRESSURE: 74 MMHG | WEIGHT: 216 LBS | HEART RATE: 61 BPM | BODY MASS INDEX: 34.86 KG/M2 | SYSTOLIC BLOOD PRESSURE: 146 MMHG | OXYGEN SATURATION: 95 %

## 2024-05-22 DIAGNOSIS — I48.20 CHRONIC ATRIAL FIBRILLATION: Primary | ICD-10-CM

## 2024-05-22 DIAGNOSIS — Z79.01 LONG TERM (CURRENT) USE OF ANTICOAGULANTS: ICD-10-CM

## 2024-05-22 LAB — INR PPP: 2 (ref 2–3)

## 2024-05-22 PROCEDURE — 85610 PROTHROMBIN TIME: CPT | Performed by: INTERNAL MEDICINE

## 2024-05-22 PROCEDURE — 36416 COLLJ CAPILLARY BLOOD SPEC: CPT | Performed by: INTERNAL MEDICINE

## 2024-05-23 ENCOUNTER — TELEPHONE (OUTPATIENT)
Dept: CARDIOLOGY | Facility: CLINIC | Age: 79
End: 2024-05-23
Payer: MEDICARE

## 2024-05-23 NOTE — TELEPHONE ENCOUNTER
Hub staff attempted to follow warm transfer process and was unsuccessful     Caller: SHARAN GOODWIN    Relationship to patient: Emergency Contact    Best call back number: [phone]    Patient is needing: PATIENT NEEDS TO RESCHEDULE 06.25.24 ANTICOAGULATION APPOINTMENT. PLEASE CONTACT PATIENT. THANK YOU

## 2024-06-24 ENCOUNTER — ANTICOAGULATION VISIT (OUTPATIENT)
Dept: CARDIOLOGY | Facility: CLINIC | Age: 79
End: 2024-06-24
Payer: MEDICARE

## 2024-06-24 VITALS
WEIGHT: 217 LBS | DIASTOLIC BLOOD PRESSURE: 59 MMHG | SYSTOLIC BLOOD PRESSURE: 125 MMHG | HEART RATE: 73 BPM | BODY MASS INDEX: 35.02 KG/M2

## 2024-06-24 DIAGNOSIS — I48.20 CHRONIC ATRIAL FIBRILLATION: Primary | ICD-10-CM

## 2024-06-24 DIAGNOSIS — Z79.01 LONG TERM (CURRENT) USE OF ANTICOAGULANTS: ICD-10-CM

## 2024-06-24 LAB — INR PPP: 2.2 (ref 2–3)

## 2024-06-24 PROCEDURE — 85610 PROTHROMBIN TIME: CPT | Performed by: INTERNAL MEDICINE

## 2024-06-24 PROCEDURE — 36416 COLLJ CAPILLARY BLOOD SPEC: CPT | Performed by: INTERNAL MEDICINE

## 2024-08-05 ENCOUNTER — TELEPHONE (OUTPATIENT)
Dept: CARDIOLOGY | Facility: CLINIC | Age: 79
End: 2024-08-05
Payer: MEDICARE

## 2024-08-05 DIAGNOSIS — Z79.01 LONG TERM (CURRENT) USE OF ANTICOAGULANTS: ICD-10-CM

## 2024-08-05 DIAGNOSIS — I48.0 PAROXYSMAL ATRIAL FIBRILLATION: ICD-10-CM

## 2024-08-05 RX ORDER — WARFARIN SODIUM 4 MG/1
TABLET ORAL
Qty: 150 TABLET | Refills: 0 | Status: SHIPPED | OUTPATIENT
Start: 2024-08-05

## 2024-08-05 NOTE — TELEPHONE ENCOUNTER
Incoming Refill Request      Medication requested (name and dose): WARFARIN 4MG    Pharmacy where request should be sent: [facility]    Additional details provided by patient:     Best call back number: [phone]    Does the patient have less than a 3 day supply:  [] Yes  [] No    Misa Valdovinos Rep  08/05/24, 12:00 EDT

## 2024-08-05 NOTE — TELEPHONE ENCOUNTER
Rx Refill Note  Requested Prescriptions     Pending Prescriptions Disp Refills    warfarin (Jantoven) 4 MG tablet 150 tablet 0     Sig: Take 1 1/2 tabs, by mouth, daily except 2 tabs on Tues and Thurs or as directed.      Last office visit with prescribing clinician: 4/24/2024   Last telemedicine visit with prescribing clinician: Visit date not found   Next office visit with prescribing clinician: 10/30/2024     Anticoagulation Visit (06/24/2024)       Pau Cohen LPN  08/05/24, 15:23 EDT

## 2024-08-06 ENCOUNTER — ANTICOAGULATION VISIT (OUTPATIENT)
Dept: CARDIOLOGY | Facility: CLINIC | Age: 79
End: 2024-08-06
Payer: MEDICARE

## 2024-08-06 VITALS
BODY MASS INDEX: 34.86 KG/M2 | SYSTOLIC BLOOD PRESSURE: 136 MMHG | DIASTOLIC BLOOD PRESSURE: 66 MMHG | WEIGHT: 216 LBS | HEART RATE: 74 BPM

## 2024-08-06 DIAGNOSIS — I48.20 CHRONIC ATRIAL FIBRILLATION: Primary | ICD-10-CM

## 2024-08-06 DIAGNOSIS — Z79.01 LONG TERM (CURRENT) USE OF ANTICOAGULANTS: ICD-10-CM

## 2024-08-06 LAB — INR PPP: 1.8 (ref 2–3)

## 2024-08-06 PROCEDURE — 85610 PROTHROMBIN TIME: CPT | Performed by: INTERNAL MEDICINE

## 2024-08-06 PROCEDURE — 36416 COLLJ CAPILLARY BLOOD SPEC: CPT | Performed by: INTERNAL MEDICINE

## 2024-09-17 ENCOUNTER — ANTICOAGULATION VISIT (OUTPATIENT)
Dept: CARDIOLOGY | Facility: CLINIC | Age: 79
End: 2024-09-17
Payer: MEDICARE

## 2024-09-17 VITALS
DIASTOLIC BLOOD PRESSURE: 62 MMHG | HEART RATE: 93 BPM | BODY MASS INDEX: 33.89 KG/M2 | SYSTOLIC BLOOD PRESSURE: 119 MMHG | WEIGHT: 210 LBS

## 2024-09-17 DIAGNOSIS — Z79.01 LONG TERM (CURRENT) USE OF ANTICOAGULANTS: ICD-10-CM

## 2024-09-17 DIAGNOSIS — I48.20 CHRONIC ATRIAL FIBRILLATION: Primary | ICD-10-CM

## 2024-09-17 LAB — INR PPP: 1.7 (ref 2–3)

## 2024-09-17 PROCEDURE — 85610 PROTHROMBIN TIME: CPT | Performed by: INTERNAL MEDICINE

## 2024-09-17 PROCEDURE — 36416 COLLJ CAPILLARY BLOOD SPEC: CPT | Performed by: INTERNAL MEDICINE

## 2024-10-01 ENCOUNTER — TELEPHONE (OUTPATIENT)
Dept: CARDIOLOGY | Facility: CLINIC | Age: 79
End: 2024-10-01
Payer: MEDICARE

## 2024-10-01 NOTE — TELEPHONE ENCOUNTER
Incoming Refill Request      Medication requested (name and dose): Ditiazem 180 mg daily    Pharmacy where request should be sent: [address]     Additional details provided by patient: Would like 90 day rx     Best call back number: [phone]    Does the patient have less than a 3 day supply:  [] Yes  [] No

## 2024-10-02 RX ORDER — DILTIAZEM HYDROCHLORIDE 180 MG/1
180 CAPSULE, COATED, EXTENDED RELEASE ORAL DAILY
Qty: 90 CAPSULE | Refills: 3 | Status: SHIPPED | OUTPATIENT
Start: 2024-10-02

## 2024-10-30 ENCOUNTER — ANTICOAGULATION VISIT (OUTPATIENT)
Dept: CARDIOLOGY | Facility: CLINIC | Age: 79
End: 2024-10-30
Payer: MEDICARE

## 2024-10-30 ENCOUNTER — CLINICAL SUPPORT NO REQUIREMENTS (OUTPATIENT)
Dept: CARDIOLOGY | Facility: CLINIC | Age: 79
End: 2024-10-30
Payer: MEDICARE

## 2024-10-30 ENCOUNTER — OFFICE VISIT (OUTPATIENT)
Dept: CARDIOLOGY | Facility: CLINIC | Age: 79
End: 2024-10-30
Payer: MEDICARE

## 2024-10-30 VITALS
WEIGHT: 213 LBS | SYSTOLIC BLOOD PRESSURE: 144 MMHG | HEART RATE: 69 BPM | BODY MASS INDEX: 34.38 KG/M2 | DIASTOLIC BLOOD PRESSURE: 67 MMHG

## 2024-10-30 VITALS
HEART RATE: 69 BPM | BODY MASS INDEX: 36.37 KG/M2 | DIASTOLIC BLOOD PRESSURE: 68 MMHG | SYSTOLIC BLOOD PRESSURE: 150 MMHG | HEIGHT: 64 IN | WEIGHT: 213 LBS

## 2024-10-30 DIAGNOSIS — I48.0 PAROXYSMAL ATRIAL FIBRILLATION: Primary | ICD-10-CM

## 2024-10-30 DIAGNOSIS — Z79.01 LONG TERM (CURRENT) USE OF ANTICOAGULANTS: ICD-10-CM

## 2024-10-30 DIAGNOSIS — R00.1 BRADYCARDIA: Primary | ICD-10-CM

## 2024-10-30 DIAGNOSIS — I48.20 CHRONIC ATRIAL FIBRILLATION: Primary | ICD-10-CM

## 2024-10-30 DIAGNOSIS — I10 PRIMARY HYPERTENSION: ICD-10-CM

## 2024-10-30 DIAGNOSIS — Z95.0 PACEMAKER: ICD-10-CM

## 2024-10-30 DIAGNOSIS — R00.1 BRADYCARDIA: ICD-10-CM

## 2024-10-30 LAB — INR PPP: 1.7 (ref 0.9–1.1)

## 2024-10-30 PROCEDURE — 85610 PROTHROMBIN TIME: CPT | Performed by: INTERNAL MEDICINE

## 2024-10-30 PROCEDURE — 1160F RVW MEDS BY RX/DR IN RCRD: CPT | Performed by: INTERNAL MEDICINE

## 2024-10-30 PROCEDURE — 36416 COLLJ CAPILLARY BLOOD SPEC: CPT | Performed by: INTERNAL MEDICINE

## 2024-10-30 PROCEDURE — 3077F SYST BP >= 140 MM HG: CPT | Performed by: INTERNAL MEDICINE

## 2024-10-30 PROCEDURE — 1159F MED LIST DOCD IN RCRD: CPT | Performed by: INTERNAL MEDICINE

## 2024-10-30 PROCEDURE — 3078F DIAST BP <80 MM HG: CPT | Performed by: INTERNAL MEDICINE

## 2024-10-30 PROCEDURE — 99214 OFFICE O/P EST MOD 30 MIN: CPT | Performed by: INTERNAL MEDICINE

## 2024-10-30 NOTE — PROGRESS NOTES
Pts INR had been running low the last 3 checks. Today's INR was 1.7. No report of missing med or change in diet, etc. Will increase dosage to 8 mgs Mon, Weds and Fri with 6 mgs all other days. Recheck in a month. [email or web address]

## 2024-10-30 NOTE — PROGRESS NOTES
Subjective:     Encounter Date:10/30/2024      Patient ID: Jasmin Mehta is a 79 y.o. female.    Chief Complaint:  History of Present Illness 79-year-old white female with history of atrial fibrillation with decubitus ulcers post pacemaker placement history of hypertension hyperlipidemia presents to my office for a follow-up.  Patient is currently stable without isms of chest pain or shortness of breath at rest or exertion.  No grandmas any PND orthopnea.  No palpitations dizziness syncope or swelling of the feet.  She is taking her medicines regularly.  She does not smoke.    The following portions of the patient's history were reviewed and updated as appropriate: allergies, current medications, past family history, past medical history, past social history, past surgical history, and problem list.  Past Medical History:   Diagnosis Date    Arm fracture, right 05/2020    Pt seen Dr. Alexander    Arrhythmia     Atrial fibrillation     CHF (congestive heart failure)     Chronic coronary artery disease     DVT (deep venous thrombosis)     and venous filter    Fracture of wrist 2020    Fracture, radius     Heart valve disease     Hypertension     Primary osteoarthritis of right knee 09/13/2021    Stroke      Past Surgical History:   Procedure Laterality Date    BARIATRIC SURGERY  2005    CARDIAC CATHETERIZATION  02/01/2012    CARDIAC CATHETERIZATION Right 9/19/2023    Procedure: Left Heart Cath, possible PCI;  Surgeon: José Smith MD;  Location: [facility] CATH INVASIVE LOCATION;  Service: Cardiovascular;  Laterality: Right;    CARDIAC CATHETERIZATION N/A 9/19/2023    Procedure: Left ventriculography;  Surgeon: José Smith MD;  Location: [facility] CATH INVASIVE LOCATION;  Service: Cardiovascular;  Laterality: N/A;    CARDIAC CATHETERIZATION N/A 9/19/2023    Procedure: Coronary angiography;  Surgeon: José Smith MD;  Location: [facility] CATH INVASIVE LOCATION;  Service: Cardiovascular;  Laterality: N/A;    CARDIAC  "ELECTROPHYSIOLOGY PROCEDURE N/A 07/31/2020    Procedure: PPM generator change - dual;  Surgeon: Valente Duong MD;  Location:  [facility] CATH INVASIVE LOCATION;  Service: Cardiovascular;  Laterality: N/A;    CARDIAC ELECTROPHYSIOLOGY PROCEDURE N/A 07/31/2020    Procedure: Pocket Revision;  Surgeon: Valente Duong MD;  Location:  [facility] CATH INVASIVE LOCATION;  Service: Cardiovascular;  Laterality: N/A;    CARDIOVERSION      CARDIOVERSION X 3    CHOLECYSTECTOMY      GASTRIC BYPASS  2005    HERNIA REPAIR      INSERT / REPLACE / REMOVE PACEMAKER      OTHER SURGICAL HISTORY      RF ablation failure because of lack of RA access    PACEMAKER IMPLANTATION  08/15/2012    permanent dual chamber - SAVOtronic MRI compatible    TONSILLECTOMY      TRIGGER POINT INJECTION  4/2023     /68 Comment: recheck  Pulse 69   Ht 162.6 cm (64\")   Wt 96.6 kg (213 lb)   LMP  (LMP Unknown)   BMI 36.56 kg/m²   Family History   Problem Relation Age of Onset    Stroke Father     Stroke Other     Heart disease Other         FH - MI    Breast cancer Mother     Heart attack Mother     Cancer Mother        Current Outpatient Medications:     calcium carbonate (OS-MEKA) 600 MG tablet, Take 1 tablet by mouth Daily., Disp: , Rfl:     dilTIAZem CD (CARDIZEM CD) 180 MG 24 hr capsule, Take 1 capsule by mouth Daily., Disp: 90 capsule, Rfl: 3    esomeprazole (nexIUM) 40 MG capsule, Take 1 capsule by mouth Every Morning Before Breakfast., Disp: , Rfl:     furosemide (LASIX) 20 MG tablet, Take 1 tablet by mouth 2 (Two) Times a Day., Disp: 180 tablet, Rfl: 3    metoprolol tartrate (LOPRESSOR) 25 MG tablet, Take 1 tablet by mouth 2 (Two) Times a Day., Disp: , Rfl:     Multiple Vitamin (MULTI-VITAMIN DAILY PO), Take  by mouth., Disp: , Rfl:     Omega-3 1000 MG capsule, Take  by mouth., Disp: , Rfl:     spironolactone (ALDACTONE) 25 MG tablet, , Disp: , Rfl:     warfarin (Jantoven) 4 MG tablet, Take 1 1/2 tabs, by mouth, daily except 2 tabs on Tues and " Thurs or as directed., Disp: 150 tablet, Rfl: 0  Allergies   Allergen Reactions    Lactose Intolerance (Gi) GI Intolerance     Severe stomach pain    Lactulose GI Intolerance     Severe stomach pain     Social History     Socioeconomic History    Marital status:    Tobacco Use    Smoking status: Never     Passive exposure: Never    Smokeless tobacco: Never   Vaping Use    Vaping status: Never Used   Substance and Sexual Activity    Alcohol use: Never    Drug use: Never    Sexual activity: Not Currently     Partners: Male     Birth control/protection: Post-menopausal, None     Review of Systems   Constitutional: Negative for malaise/fatigue.   Cardiovascular:  Negative for chest pain, dyspnea on exertion, leg swelling and palpitations.   Respiratory:  Negative for cough and shortness of breath.    Gastrointestinal:  Negative for abdominal pain, nausea and vomiting.   Neurological:  Negative for dizziness, focal weakness, headaches, light-headedness and numbness.   All other systems reviewed and are negative.             Objective:     Constitutional:       Appearance: Well-developed.   Eyes:      General: No scleral icterus.     Conjunctiva/sclera: Conjunctivae normal.   HENT:      Head: Normocephalic and atraumatic.   Neck:      Vascular: No carotid bruit or JVD.   Pulmonary:      Effort: Pulmonary effort is normal.      Breath sounds: Normal breath sounds. No wheezing. No rales.   Cardiovascular:      Normal rate. Irregularly irregular rhythm.   Pulses:     Intact distal pulses.   Abdominal:      General: Bowel sounds are normal.      Palpations: Abdomen is soft.   Musculoskeletal:      Cervical back: Normal range of motion and neck supple. Skin:     General: Skin is warm and dry.      Findings: No rash.   Neurological:      Mental Status: Alert.       Procedures    Lab Review:         MDM    #1 atrial fibrillation  Patient has history of atrial fibrillation is currently stable on medications including  metoprolol and warfarin keep his INR around 2-2.5  2.  Hypertension  Patient blood pressure currently stable on metoprolol and diltiazem  3 history of taking medicine from status post pacemaker placement  Patient's pacemaker is working very well  4.  Hyperlipidemia  Patient is on over-the-counter medicines only.    Patient's previous medical records, labs, and EKG were reviewed and discussed with the patient at today's visit.

## 2024-11-18 ENCOUNTER — TELEPHONE (OUTPATIENT)
Dept: CARDIOLOGY | Facility: CLINIC | Age: 79
End: 2024-11-18
Payer: MEDICARE

## 2024-11-18 DIAGNOSIS — I48.0 PAROXYSMAL ATRIAL FIBRILLATION: ICD-10-CM

## 2024-11-18 DIAGNOSIS — Z79.01 LONG TERM (CURRENT) USE OF ANTICOAGULANTS: ICD-10-CM

## 2024-11-18 RX ORDER — WARFARIN SODIUM 4 MG/1
TABLET ORAL
Qty: 154 TABLET | Refills: 0 | Status: SHIPPED | OUTPATIENT
Start: 2024-11-18

## 2024-11-18 NOTE — TELEPHONE ENCOUNTER
Rx Refill Note  Requested Prescriptions     Pending Prescriptions Disp Refills    warfarin (Jantoven) 4 MG tablet 154 tablet 0     Sig: Take 2 tablets by mouth on Monday Wednesday and Friday and one and one half tablets by mouth all other days or as directed      Last office visit with prescribing clinician: 10/30/2024   Last telemedicine visit with prescribing clinician: Visit date not found   Next office visit with prescribing clinician: 5/14/2025     Anticoagulation Visit (10/30/2024)     Pau Cohen LPN  11/18/24, 15:29 EST

## 2024-11-18 NOTE — TELEPHONE ENCOUNTER
Incoming Refill Request      Medication requested (name and dose): Warfarin    Pharmacy where request should be sent: [facility]    Additional details provided by patient:     Best call back number: [phone]    Does the patient have less than a 3 day supply:  [] Yes  [x] No    Misa Valdovinos Rep  11/18/24, 12:30 EST

## 2024-12-03 ENCOUNTER — ANTICOAGULATION VISIT (OUTPATIENT)
Dept: CARDIOLOGY | Facility: CLINIC | Age: 79
End: 2024-12-03
Payer: MEDICARE

## 2024-12-03 VITALS
HEART RATE: 74 BPM | BODY MASS INDEX: 36.9 KG/M2 | WEIGHT: 215 LBS | DIASTOLIC BLOOD PRESSURE: 65 MMHG | SYSTOLIC BLOOD PRESSURE: 138 MMHG

## 2024-12-03 DIAGNOSIS — Z79.01 LONG TERM (CURRENT) USE OF ANTICOAGULANTS: ICD-10-CM

## 2024-12-03 DIAGNOSIS — I48.20 CHRONIC ATRIAL FIBRILLATION: Primary | ICD-10-CM

## 2024-12-03 LAB — INR PPP: 2 (ref 2–3)

## 2024-12-03 PROCEDURE — 85610 PROTHROMBIN TIME: CPT | Performed by: INTERNAL MEDICINE

## 2024-12-03 PROCEDURE — 36416 COLLJ CAPILLARY BLOOD SPEC: CPT | Performed by: INTERNAL MEDICINE

## 2024-12-09 ENCOUNTER — TELEPHONE (OUTPATIENT)
Dept: CARDIOLOGY | Facility: CLINIC | Age: 79
End: 2024-12-09
Payer: MEDICARE

## 2024-12-09 DIAGNOSIS — I48.0 PAROXYSMAL ATRIAL FIBRILLATION: Primary | ICD-10-CM

## 2024-12-09 RX ORDER — FUROSEMIDE 20 MG/1
20 TABLET ORAL 2 TIMES DAILY
Qty: 180 TABLET | Refills: 3 | Status: SHIPPED | OUTPATIENT
Start: 2024-12-09

## 2024-12-09 NOTE — TELEPHONE ENCOUNTER
Pt's  called and asked if her Furosamide 20mg Pharmacy could be changed to [facility] [id number] - [place], [place] - [address] AT [address] - [phone]  - [phone] FX. They are also asking if it can be a 90 day supply w 3 refills.

## 2025-01-14 ENCOUNTER — ANTICOAGULATION VISIT (OUTPATIENT)
Dept: CARDIOLOGY | Facility: CLINIC | Age: 80
End: 2025-01-14
Payer: MEDICARE

## 2025-01-14 VITALS
BODY MASS INDEX: 36.05 KG/M2 | HEART RATE: 83 BPM | DIASTOLIC BLOOD PRESSURE: 70 MMHG | SYSTOLIC BLOOD PRESSURE: 103 MMHG | WEIGHT: 210 LBS

## 2025-01-14 DIAGNOSIS — Z79.01 LONG TERM (CURRENT) USE OF ANTICOAGULANTS: ICD-10-CM

## 2025-01-14 DIAGNOSIS — I48.20 CHRONIC ATRIAL FIBRILLATION: Primary | ICD-10-CM

## 2025-01-14 LAB — INR PPP: 1.9 (ref 0.9–1.1)

## 2025-01-14 PROCEDURE — 36416 COLLJ CAPILLARY BLOOD SPEC: CPT | Performed by: INTERNAL MEDICINE

## 2025-01-14 PROCEDURE — 85610 PROTHROMBIN TIME: CPT | Performed by: INTERNAL MEDICINE

## 2025-01-15 ENCOUNTER — TELEPHONE (OUTPATIENT)
Dept: CARDIOLOGY | Facility: CLINIC | Age: 80
End: 2025-01-15
Payer: MEDICARE

## 2025-02-11 ENCOUNTER — ANTICOAGULATION VISIT (OUTPATIENT)
Dept: CARDIOLOGY | Facility: CLINIC | Age: 80
End: 2025-02-11
Payer: MEDICARE

## 2025-02-11 VITALS
SYSTOLIC BLOOD PRESSURE: 135 MMHG | HEART RATE: 73 BPM | DIASTOLIC BLOOD PRESSURE: 61 MMHG | WEIGHT: 214 LBS | BODY MASS INDEX: 36.73 KG/M2

## 2025-02-11 DIAGNOSIS — Z79.01 LONG TERM (CURRENT) USE OF ANTICOAGULANTS: ICD-10-CM

## 2025-02-11 DIAGNOSIS — I48.20 CHRONIC ATRIAL FIBRILLATION: Primary | ICD-10-CM

## 2025-02-11 LAB — INR PPP: 1.8 (ref 0.9–1.1)

## 2025-02-11 PROCEDURE — 85610 PROTHROMBIN TIME: CPT | Performed by: INTERNAL MEDICINE

## 2025-02-11 PROCEDURE — 36416 COLLJ CAPILLARY BLOOD SPEC: CPT | Performed by: INTERNAL MEDICINE

## 2025-02-11 NOTE — PROGRESS NOTES
Pts INR continues to run slightly low at 1.8. Pt has had multiple borderline results, so we will do a small increase in her Coumadin dosage. Increase Sat dose to 8 mgs, continue all other days the same. Recheck INR in a month. Lexi

## 2025-03-03 DIAGNOSIS — I48.0 PAROXYSMAL ATRIAL FIBRILLATION: ICD-10-CM

## 2025-03-03 DIAGNOSIS — Z79.01 LONG TERM (CURRENT) USE OF ANTICOAGULANTS: ICD-10-CM

## 2025-03-03 RX ORDER — WARFARIN SODIUM 4 MG/1
TABLET ORAL
Qty: 126 TABLET | Refills: 0 | Status: SHIPPED | OUTPATIENT
Start: 2025-03-03

## 2025-03-03 NOTE — TELEPHONE ENCOUNTER
Rx Refill Note  Requested Prescriptions     Pending Prescriptions Disp Refills    warfarin (COUMADIN) 4 MG tablet [Pharmacy Med Name: WARFARIN SODIUM 4 MG TABLET] 126 tablet      Sig: TAKE 2 TABLETS BY MOUTH ON MONDAYS. WEDNESDAYS. AND FRIDAYS. THEN 1.5 TABLETS BY MOUTH ON ALL OTHER DAYS OR AS DIRECTED      Last office visit with prescribing clinician: 10/30/2024   Last telemedicine visit with prescribing clinician: Visit date not found   Next office visit with prescribing clinician: 5/14/2025   Anticoagulation Visit 2/11/25 INR 1.8                        Would you like a call back once the refill request has been completed: [] Yes [] No    If the office needs to give you a call back, can they leave a voicemail: [] Yes [] No    Anay Gil RN  03/03/25, 17:53 EST

## 2025-03-18 ENCOUNTER — ANTICOAGULATION VISIT (OUTPATIENT)
Dept: CARDIOLOGY | Facility: CLINIC | Age: 80
End: 2025-03-18
Payer: MEDICARE

## 2025-03-18 VITALS
SYSTOLIC BLOOD PRESSURE: 127 MMHG | HEART RATE: 72 BPM | BODY MASS INDEX: 36.05 KG/M2 | WEIGHT: 210 LBS | DIASTOLIC BLOOD PRESSURE: 85 MMHG

## 2025-03-18 DIAGNOSIS — Z79.01 LONG TERM (CURRENT) USE OF ANTICOAGULANTS: ICD-10-CM

## 2025-03-18 DIAGNOSIS — I48.0 PAROXYSMAL ATRIAL FIBRILLATION: ICD-10-CM

## 2025-03-18 DIAGNOSIS — I48.20 CHRONIC ATRIAL FIBRILLATION: Primary | ICD-10-CM

## 2025-03-18 LAB — INR PPP: 2.7 (ref 0.9–1.1)

## 2025-03-18 PROCEDURE — 36416 COLLJ CAPILLARY BLOOD SPEC: CPT | Performed by: INTERNAL MEDICINE

## 2025-03-18 PROCEDURE — 85610 PROTHROMBIN TIME: CPT | Performed by: INTERNAL MEDICINE

## 2025-03-18 RX ORDER — FUROSEMIDE 20 MG/1
20 TABLET ORAL 2 TIMES DAILY
Qty: 180 TABLET | Refills: 3 | Status: SHIPPED | OUTPATIENT
Start: 2025-03-18

## 2025-04-22 ENCOUNTER — ANTICOAGULATION VISIT (OUTPATIENT)
Dept: CARDIOLOGY | Facility: CLINIC | Age: 80
End: 2025-04-22
Payer: MEDICARE

## 2025-04-22 DIAGNOSIS — Z79.01 LONG TERM (CURRENT) USE OF ANTICOAGULANTS: ICD-10-CM

## 2025-04-22 DIAGNOSIS — I48.20 CHRONIC ATRIAL FIBRILLATION: Primary | ICD-10-CM

## 2025-04-22 LAB — INR PPP: 2 (ref 0.9–1.1)

## 2025-04-22 PROCEDURE — 36416 COLLJ CAPILLARY BLOOD SPEC: CPT | Performed by: INTERNAL MEDICINE

## 2025-04-22 PROCEDURE — 85610 PROTHROMBIN TIME: CPT | Performed by: INTERNAL MEDICINE

## 2025-05-14 ENCOUNTER — CLINICAL SUPPORT NO REQUIREMENTS (OUTPATIENT)
Dept: CARDIOLOGY | Facility: CLINIC | Age: 80
End: 2025-05-14
Payer: MEDICARE

## 2025-05-14 ENCOUNTER — ANTICOAGULATION VISIT (OUTPATIENT)
Dept: CARDIOLOGY | Facility: CLINIC | Age: 80
End: 2025-05-14
Payer: MEDICARE

## 2025-05-14 ENCOUNTER — OFFICE VISIT (OUTPATIENT)
Dept: CARDIOLOGY | Facility: CLINIC | Age: 80
End: 2025-05-14
Payer: MEDICARE

## 2025-05-14 VITALS
OXYGEN SATURATION: 94 % | BODY MASS INDEX: 36.7 KG/M2 | HEIGHT: 64 IN | HEART RATE: 71 BPM | WEIGHT: 215 LBS | DIASTOLIC BLOOD PRESSURE: 73 MMHG | SYSTOLIC BLOOD PRESSURE: 119 MMHG

## 2025-05-14 DIAGNOSIS — I48.20 CHRONIC ATRIAL FIBRILLATION: Primary | ICD-10-CM

## 2025-05-14 DIAGNOSIS — I25.10 CORONARY ARTERY DISEASE INVOLVING NATIVE CORONARY ARTERY OF NATIVE HEART, UNSPECIFIED WHETHER ANGINA PRESENT: ICD-10-CM

## 2025-05-14 DIAGNOSIS — Z95.0 PACEMAKER: ICD-10-CM

## 2025-05-14 DIAGNOSIS — E78.00 PURE HYPERCHOLESTEROLEMIA: ICD-10-CM

## 2025-05-14 DIAGNOSIS — Z79.01 LONG TERM (CURRENT) USE OF ANTICOAGULANTS: ICD-10-CM

## 2025-05-14 DIAGNOSIS — R00.1 BRADYCARDIA: Primary | ICD-10-CM

## 2025-05-14 DIAGNOSIS — I10 PRIMARY HYPERTENSION: ICD-10-CM

## 2025-05-14 LAB — INR PPP: 2.6 (ref 0.9–1.1)

## 2025-05-14 PROCEDURE — 36416 COLLJ CAPILLARY BLOOD SPEC: CPT | Performed by: INTERNAL MEDICINE

## 2025-05-14 PROCEDURE — 85610 PROTHROMBIN TIME: CPT | Performed by: INTERNAL MEDICINE

## 2025-05-14 NOTE — PROGRESS NOTES
Subjective:     Encounter Date:05/14/2025      Patient ID: Jasmin Mehta is a 79 y.o. female.    Chief Complaint:  History of Present Illness 79-year-old white female with history of coronary disease hypertension hyperlipidemia history of atrial fibrillation status post pacemaker placement presents to the office for a follow-up.  Patient is currently stable without any symptoms of chest pain or shortness of breath at rest or exertion.  No complaint any PND orthopnea.  No palpitations dizziness syncope patient has no swelling of the feet.  She is taking medicine regular patient does not smoke.    The following portions of the patient's history were reviewed and updated as appropriate: allergies, current medications, past family history, past medical history, past social history, past surgical history, and problem list.  Past Medical History:   Diagnosis Date    Arm fracture, right 05/2020    Pt seen Dr. Alexander    Arrhythmia     Atrial fibrillation     CHF (congestive heart failure)     Chronic coronary artery disease     CTS (carpal tunnel syndrome)     DVT (deep venous thrombosis)     and venous filter    Fracture of wrist 2020    Fracture, radius     Heart valve disease     Hypertension     Primary osteoarthritis of right knee 09/13/2021    Stroke      Past Surgical History:   Procedure Laterality Date    BARIATRIC SURGERY  2005    CARDIAC CATHETERIZATION  02/01/2012    CARDIAC CATHETERIZATION Right 9/19/2023    Procedure: Left Heart Cath, possible PCI;  Surgeon: José Smith MD;  Location: [facility] CATH INVASIVE LOCATION;  Service: Cardiovascular;  Laterality: Right;    CARDIAC CATHETERIZATION N/A 9/19/2023    Procedure: Left ventriculography;  Surgeon: José Smith MD;  Location: [facility] CATH INVASIVE LOCATION;  Service: Cardiovascular;  Laterality: N/A;    CARDIAC CATHETERIZATION N/A 9/19/2023    Procedure: Coronary angiography;  Surgeon: José Smith MD;  Location: [facility] CATH INVASIVE LOCATION;  Service:  "Cardiovascular;  Laterality: N/A;    CARDIAC ELECTROPHYSIOLOGY PROCEDURE N/A 07/31/2020    Procedure: PPM generator change - dual;  Surgeon: Valente Duong MD;  Location:  [facility] CATH INVASIVE LOCATION;  Service: Cardiovascular;  Laterality: N/A;    CARDIAC ELECTROPHYSIOLOGY PROCEDURE N/A 07/31/2020    Procedure: Pocket Revision;  Surgeon: Valente Duong MD;  Location:  [facility] CATH INVASIVE LOCATION;  Service: Cardiovascular;  Laterality: N/A;    CARDIOVERSION      CARDIOVERSION X 3    CHOLECYSTECTOMY      GASTRIC BYPASS  2005    HERNIA REPAIR      INSERT / REPLACE / REMOVE PACEMAKER      OTHER SURGICAL HISTORY      RF ablation failure because of lack of RA access    PACEMAKER IMPLANTATION  08/15/2012    permanent dual chamber - [employer]ronic MRI compatible    TONSILLECTOMY      TRIGGER POINT INJECTION  4/2023     /73 (BP Location: Left arm, Patient Position: Sitting, Cuff Size: Large Adult)   Pulse 71   Ht 162.6 cm (64\")   Wt 97.5 kg (215 lb)   LMP  (LMP Unknown)   SpO2 94%   BMI 36.90 kg/m²   Family History   Problem Relation Age of Onset    Stroke Father     Stroke Other     Heart disease Other         FH - MI    Breast cancer Mother     Heart attack Mother     Cancer Mother        Current Outpatient Medications:     Apoaequorin (PREVAGEN EXTRA STRENGTH PO), Take 30 mg by mouth Daily., Disp: , Rfl:     calcium carbonate (OS-MEKA) 600 MG tablet, Take 1 tablet by mouth Daily., Disp: , Rfl:     dilTIAZem CD (CARDIZEM CD) 180 MG 24 hr capsule, Take 1 capsule by mouth Daily., Disp: 90 capsule, Rfl: 3    esomeprazole (nexIUM) 40 MG capsule, Take 1 capsule by mouth Every Morning Before Breakfast., Disp: , Rfl:     furosemide (LASIX) 20 MG tablet, Take 1 tablet by mouth 2 (Two) Times a Day., Disp: 180 tablet, Rfl: 3    metoprolol tartrate (LOPRESSOR) 25 MG tablet, Take 1 tablet by mouth 2 (Two) Times a Day., Disp: , Rfl:     Multiple Vitamin (MULTI-VITAMIN DAILY PO), Take  by mouth., Disp: , Rfl:     Omega-3 " 1000 MG capsule, Take  by mouth., Disp: , Rfl:     spironolactone (ALDACTONE) 25 MG tablet, , Disp: , Rfl:     warfarin (COUMADIN) 4 MG tablet, TAKE 2 TABLETS BY MOUTH ON MONDAYS. WEDNESDAYS. AND FRIDAYS. THEN 1.5 TABLETS BY MOUTH ON ALL OTHER DAYS OR AS DIRECTED, Disp: 126 tablet, Rfl: 0  Allergies   Allergen Reactions    Lactose Intolerance (Gi) GI Intolerance     Severe stomach pain    Lactulose GI Intolerance     Severe stomach pain     Social History     Socioeconomic History    Marital status:    Tobacco Use    Smoking status: Never     Passive exposure: Never    Smokeless tobacco: Never   Vaping Use    Vaping status: Never Used   Substance and Sexual Activity    Alcohol use: Never    Drug use: Never    Sexual activity: Not Currently     Partners: Male     Birth control/protection: None     Review of Systems   Constitutional: Negative for malaise/fatigue.   Cardiovascular:  Positive for leg swelling. Negative for chest pain, dyspnea on exertion and palpitations.   Respiratory:  Negative for cough and shortness of breath.    Gastrointestinal:  Negative for abdominal pain, nausea and vomiting.   Neurological:  Negative for dizziness, headaches, light-headedness, numbness and weakness.   All other systems reviewed and are negative.             Objective:     Constitutional:       Appearance: Well-developed.   Eyes:      General: No scleral icterus.     Conjunctiva/sclera: Conjunctivae normal.   HENT:      Head: Normocephalic and atraumatic.   Neck:      Vascular: No carotid bruit or JVD.   Pulmonary:      Effort: Pulmonary effort is normal.      Breath sounds: Normal breath sounds. No wheezing. No rales.   Cardiovascular:      Normal rate. Regular rhythm.   Pulses:     Intact distal pulses.   Edema:     Peripheral edema present.  Abdominal:      General: Bowel sounds are normal.      Palpations: Abdomen is soft.   Musculoskeletal:      Cervical back: Normal range of motion and neck supple. Skin:      General: Skin is warm and dry.      Findings: No rash.   Neurological:      Mental Status: Alert.       Procedures    Lab Review:         MDM    #1 coronary artery disease  Patient has nonobstructive disease now and is currently stable on medication without any angina  2.  Hypertension  Patient blood pressure currently stable on diltiazem and metoprolol  3.  Atrial fibrillation  Patient is history of atrial fibrillation is currently on warfarin and keep the INR around 2-2.5  4.  Pacemaker placement  Patient had sick sinus syndrome and is status post pacemaker placement and the pacemaker is working very well.    Patient's previous medical records, labs, and EKG were reviewed and discussed with the patient at today's visit.

## 2025-05-19 DIAGNOSIS — Z79.01 LONG TERM (CURRENT) USE OF ANTICOAGULANTS: ICD-10-CM

## 2025-05-19 DIAGNOSIS — I48.0 PAROXYSMAL ATRIAL FIBRILLATION: ICD-10-CM

## 2025-05-19 RX ORDER — WARFARIN SODIUM 4 MG/1
TABLET ORAL
Qty: 126 TABLET | Refills: 0 | Status: SHIPPED | OUTPATIENT
Start: 2025-05-19

## 2025-05-19 NOTE — TELEPHONE ENCOUNTER
Warfarin 4mg, TAKE 2 TABLETS BY MOUTH ON MONDAYS. WEDNESDAYS. AND FRIDAYS. THEN 1.5 TABLETS BY MOUTH ON ALL OTHER DAYS OR AS DIRECTED. Script sent to [facility] for refill. [email or web address]

## 2025-06-17 ENCOUNTER — TELEPHONE (OUTPATIENT)
Dept: CARDIOLOGY | Facility: CLINIC | Age: 80
End: 2025-06-17
Payer: MEDICARE

## 2025-06-17 ENCOUNTER — ANTICOAGULATION VISIT (OUTPATIENT)
Dept: CARDIOLOGY | Facility: CLINIC | Age: 80
End: 2025-06-17
Payer: MEDICARE

## 2025-06-17 VITALS
SYSTOLIC BLOOD PRESSURE: 123 MMHG | WEIGHT: 213 LBS | DIASTOLIC BLOOD PRESSURE: 72 MMHG | HEART RATE: 74 BPM | BODY MASS INDEX: 36.56 KG/M2

## 2025-06-17 DIAGNOSIS — I48.20 CHRONIC ATRIAL FIBRILLATION: Primary | ICD-10-CM

## 2025-06-17 DIAGNOSIS — Z79.01 LONG TERM (CURRENT) USE OF ANTICOAGULANTS: ICD-10-CM

## 2025-06-17 LAB — INR PPP: 2 (ref 0.9–1.1)

## 2025-06-17 PROCEDURE — 85610 PROTHROMBIN TIME: CPT | Performed by: INTERNAL MEDICINE

## 2025-06-17 PROCEDURE — 36416 COLLJ CAPILLARY BLOOD SPEC: CPT | Performed by: INTERNAL MEDICINE

## 2025-06-17 NOTE — PROGRESS NOTES
Patient's INR- 2.0, within therapeutic range. Continue current dosage and recheck in 1 month. [email or web address]

## 2025-06-17 NOTE — TELEPHONE ENCOUNTER
Hub staff attempted to follow warm transfer process and was unsuccessful     Caller: SHARAN GOODWIN    Relationship to patient: Emergency Contact    Best call back number: [phone]    Patient is needing: PATIENT'S  SHARAN STATED THAT HE WAS CALLING TO RESCHEDULE INR APPOINTMENT. PLEASE CONTACT SHARAN TO RESCHEDULE. THANK YOU.

## 2025-07-22 ENCOUNTER — ANTICOAGULATION VISIT (OUTPATIENT)
Dept: CARDIOLOGY | Facility: CLINIC | Age: 80
End: 2025-07-22
Payer: MEDICARE

## 2025-07-22 VITALS
BODY MASS INDEX: 37.08 KG/M2 | WEIGHT: 216 LBS | HEART RATE: 78 BPM | DIASTOLIC BLOOD PRESSURE: 68 MMHG | SYSTOLIC BLOOD PRESSURE: 129 MMHG

## 2025-07-22 DIAGNOSIS — I48.20 CHRONIC ATRIAL FIBRILLATION: Primary | ICD-10-CM

## 2025-07-22 DIAGNOSIS — Z79.01 LONG TERM (CURRENT) USE OF ANTICOAGULANTS: ICD-10-CM

## 2025-07-22 LAB — INR PPP: 1.8 (ref 0.9–1.1)

## 2025-07-22 PROCEDURE — 85610 PROTHROMBIN TIME: CPT | Performed by: INTERNAL MEDICINE

## 2025-07-22 PROCEDURE — 36416 COLLJ CAPILLARY BLOOD SPEC: CPT | Performed by: INTERNAL MEDICINE

## 2025-07-22 NOTE — PROGRESS NOTES
Pts INR was slightly low at 1.8. Pt will take 8 mgs today for a boost, then resume current dosage and recheck in a month. [email or web address]

## 2025-08-10 DIAGNOSIS — I48.0 PAROXYSMAL ATRIAL FIBRILLATION: ICD-10-CM

## 2025-08-10 DIAGNOSIS — Z79.01 LONG TERM (CURRENT) USE OF ANTICOAGULANTS: ICD-10-CM

## 2025-08-11 RX ORDER — WARFARIN SODIUM 4 MG/1
TABLET ORAL
Qty: 140 TABLET | Refills: 0 | Status: SHIPPED | OUTPATIENT
Start: 2025-08-11

## 2025-08-27 ENCOUNTER — ANTICOAGULATION VISIT (OUTPATIENT)
Dept: CARDIOLOGY | Facility: CLINIC | Age: 80
End: 2025-08-27
Payer: MEDICARE

## 2025-08-27 VITALS
SYSTOLIC BLOOD PRESSURE: 126 MMHG | DIASTOLIC BLOOD PRESSURE: 66 MMHG | HEART RATE: 80 BPM | WEIGHT: 215 LBS | BODY MASS INDEX: 36.9 KG/M2

## 2025-08-27 DIAGNOSIS — Z79.01 LONG TERM (CURRENT) USE OF ANTICOAGULANTS: ICD-10-CM

## 2025-08-27 DIAGNOSIS — I48.20 CHRONIC ATRIAL FIBRILLATION: Primary | ICD-10-CM

## 2025-08-27 LAB — INR PPP: 2.6 (ref 2–3)

## 2025-08-27 PROCEDURE — 85610 PROTHROMBIN TIME: CPT | Performed by: INTERNAL MEDICINE

## 2025-08-27 PROCEDURE — 36416 COLLJ CAPILLARY BLOOD SPEC: CPT | Performed by: INTERNAL MEDICINE

## (undated) DEVICE — PLASMABLADE PS200-040 4.0: Brand: PLASMABLADE™

## (undated) DEVICE — CATH DIAG IMPULSE FR4 6F 100CM

## (undated) DEVICE — ELECTRD DEFIB M/FUNC PROPADZ RADIOL 2PK

## (undated) DEVICE — SUT SILK 2/0 FS BLK 18IN 685G

## (undated) DEVICE — 3M™ PATIENT PLATE, CORDED, SPLIT, LARGE, 40 PER CASE, 1179: Brand: 3M™

## (undated) DEVICE — UNDYED BRAIDED (POLYGLACTIN 910), SYNTHETIC ABSORBABLE SUTURE: Brand: COATED VICRYL

## (undated) DEVICE — 3M™ STERI-STRIP™ REINFORCED ADHESIVE SKIN CLOSURES, R1547, 1/2 IN X 4 IN (12 MM X 100 MM), 6 STRIPS/ENVELOPE: Brand: 3M™ STERI-STRIP™

## (undated) DEVICE — GW DIAG EMERALD HEPCOAT MOVE JTIP STD .035 3MM 150CM

## (undated) DEVICE — RADIFOCUS OBTURATOR: Brand: RADIFOCUS

## (undated) DEVICE — PK TRY HEART CATH 50

## (undated) DEVICE — SKIN AFFIX SURG ADHESIVE 72/CS 0.55ML: Brand: MEDLINE

## (undated) DEVICE — SHT AIR TRANSFR COMFRT GLIDE LAT 40X80IN

## (undated) DEVICE — PINNACLE INTRODUCER SHEATH: Brand: PINNACLE

## (undated) DEVICE — CABL BIPOL W/ALLGTR CLIP/SM 12FT

## (undated) DEVICE — VIOLET BRAIDED (POLYGLACTIN 910), SYNTHETIC ABSORBABLE SUTURE: Brand: COATED VICRYL

## (undated) DEVICE — PACEMAKER CDS: Brand: MEDLINE INDUSTRIES, INC.

## (undated) DEVICE — CATH DIAG IMPULSE FL4 6F 100CM

## (undated) DEVICE — SKIN PREP TRAY W/CHG: Brand: MEDLINE INDUSTRIES, INC.